# Patient Record
Sex: MALE | Race: WHITE | NOT HISPANIC OR LATINO
[De-identification: names, ages, dates, MRNs, and addresses within clinical notes are randomized per-mention and may not be internally consistent; named-entity substitution may affect disease eponyms.]

---

## 2018-08-23 ENCOUNTER — APPOINTMENT (OUTPATIENT)
Dept: UROLOGY | Facility: CLINIC | Age: 78
End: 2018-08-23
Payer: MEDICARE

## 2018-08-23 DIAGNOSIS — Z78.9 OTHER SPECIFIED HEALTH STATUS: ICD-10-CM

## 2018-08-23 DIAGNOSIS — Z80.9 FAMILY HISTORY OF MALIGNANT NEOPLASM, UNSPECIFIED: ICD-10-CM

## 2018-08-23 DIAGNOSIS — Q60.0 RENAL AGENESIS, UNILATERAL: ICD-10-CM

## 2018-08-23 PROCEDURE — 99203 OFFICE O/P NEW LOW 30 MIN: CPT | Mod: 25

## 2018-08-23 PROCEDURE — 51702 INSERT TEMP BLADDER CATH: CPT

## 2018-08-24 LAB
ANION GAP SERPL CALC-SCNC: 14 MMOL/L
APPEARANCE: CLEAR
BACTERIA: NEGATIVE
BASOPHILS # BLD AUTO: 0.04 K/UL
BASOPHILS NFR BLD AUTO: 0.7 %
BILIRUBIN URINE: NEGATIVE
BLOOD URINE: NEGATIVE
BUN SERPL-MCNC: 25 MG/DL
CALCIUM SERPL-MCNC: 9.5 MG/DL
CHLORIDE SERPL-SCNC: 102 MMOL/L
CO2 SERPL-SCNC: 23 MMOL/L
COLOR: YELLOW
CREAT SERPL-MCNC: 1.1 MG/DL
EOSINOPHIL # BLD AUTO: 0.04 K/UL
EOSINOPHIL NFR BLD AUTO: 0.7 %
GLUCOSE QUALITATIVE U: NEGATIVE MG/DL
GLUCOSE SERPL-MCNC: 108 MG/DL
HCT VFR BLD CALC: 33.2 %
HGB BLD-MCNC: 10.9 G/DL
HYALINE CASTS: 1 /LPF
IMM GRANULOCYTES NFR BLD AUTO: 0.2 %
KETONES URINE: NEGATIVE
LEUKOCYTE ESTERASE URINE: NEGATIVE
LYMPHOCYTES # BLD AUTO: 0.68 K/UL
LYMPHOCYTES NFR BLD AUTO: 12.1 %
MAN DIFF?: NORMAL
MCHC RBC-ENTMCNC: 31.9 PG
MCHC RBC-ENTMCNC: 32.8 GM/DL
MCV RBC AUTO: 97.1 FL
MICROSCOPIC-UA: NORMAL
MONOCYTES # BLD AUTO: 0.47 K/UL
MONOCYTES NFR BLD AUTO: 8.4 %
NEUTROPHILS # BLD AUTO: 4.38 K/UL
NEUTROPHILS NFR BLD AUTO: 77.9 %
NITRITE URINE: NEGATIVE
PH URINE: 6
PLATELET # BLD AUTO: 165 K/UL
POTASSIUM SERPL-SCNC: 4.9 MMOL/L
PROTEIN URINE: 100 MG/DL
RBC # BLD: 3.42 M/UL
RBC # FLD: 17.2 %
RED BLOOD CELLS URINE: 6 /HPF
SODIUM SERPL-SCNC: 139 MMOL/L
SPECIFIC GRAVITY URINE: 1.01
SQUAMOUS EPITHELIAL CELLS: 2 /HPF
UROBILINOGEN URINE: NEGATIVE MG/DL
WBC # FLD AUTO: 5.62 K/UL
WHITE BLOOD CELLS URINE: 2 /HPF

## 2018-08-27 LAB — BACTERIA UR CULT: NORMAL

## 2018-08-30 ENCOUNTER — OUTPATIENT (OUTPATIENT)
Dept: OUTPATIENT SERVICES | Facility: HOSPITAL | Age: 78
LOS: 1 days | End: 2018-08-30
Payer: MEDICARE

## 2018-08-30 ENCOUNTER — APPOINTMENT (OUTPATIENT)
Dept: UROLOGY | Facility: CLINIC | Age: 78
End: 2018-08-30
Payer: MEDICARE

## 2018-08-30 VITALS
BODY MASS INDEX: 25.9 KG/M2 | TEMPERATURE: 97.4 F | SYSTOLIC BLOOD PRESSURE: 107 MMHG | HEIGHT: 67 IN | DIASTOLIC BLOOD PRESSURE: 61 MMHG | RESPIRATION RATE: 15 BRPM | HEART RATE: 71 BPM | WEIGHT: 165 LBS

## 2018-08-30 PROCEDURE — 99214 OFFICE O/P EST MOD 30 MIN: CPT | Mod: 25

## 2018-08-30 PROCEDURE — 51702 INSERT TEMP BLADDER CATH: CPT

## 2018-08-30 PROCEDURE — 51798 US URINE CAPACITY MEASURE: CPT

## 2018-09-04 DIAGNOSIS — N40.1 BENIGN PROSTATIC HYPERPLASIA WITH LOWER URINARY TRACT SYMPTOMS: ICD-10-CM

## 2018-09-04 DIAGNOSIS — R33.8 OTHER RETENTION OF URINE: ICD-10-CM

## 2018-09-07 ENCOUNTER — OUTPATIENT (OUTPATIENT)
Dept: OUTPATIENT SERVICES | Facility: HOSPITAL | Age: 78
LOS: 1 days | End: 2018-09-07
Payer: MEDICARE

## 2018-09-07 VITALS
DIASTOLIC BLOOD PRESSURE: 74 MMHG | SYSTOLIC BLOOD PRESSURE: 113 MMHG | OXYGEN SATURATION: 99 % | TEMPERATURE: 98 F | HEART RATE: 67 BPM | HEIGHT: 67 IN | WEIGHT: 166.01 LBS | RESPIRATION RATE: 15 BRPM

## 2018-09-07 DIAGNOSIS — Z01.818 ENCOUNTER FOR OTHER PREPROCEDURAL EXAMINATION: ICD-10-CM

## 2018-09-07 DIAGNOSIS — I48.91 UNSPECIFIED ATRIAL FIBRILLATION: Chronic | ICD-10-CM

## 2018-09-07 DIAGNOSIS — N40.0 BENIGN PROSTATIC HYPERPLASIA WITHOUT LOWER URINARY TRACT SYMPTOMS: ICD-10-CM

## 2018-09-07 DIAGNOSIS — I48.91 UNSPECIFIED ATRIAL FIBRILLATION: ICD-10-CM

## 2018-09-07 DIAGNOSIS — N40.1 BENIGN PROSTATIC HYPERPLASIA WITH LOWER URINARY TRACT SYMPTOMS: ICD-10-CM

## 2018-09-07 DIAGNOSIS — Z98.890 OTHER SPECIFIED POSTPROCEDURAL STATES: Chronic | ICD-10-CM

## 2018-09-07 DIAGNOSIS — I25.10 ATHEROSCLEROTIC HEART DISEASE OF NATIVE CORONARY ARTERY WITHOUT ANGINA PECTORIS: ICD-10-CM

## 2018-09-07 LAB
BLD GP AB SCN SERPL QL: NEGATIVE — SIGNIFICANT CHANGE UP
RH IG SCN BLD-IMP: POSITIVE — SIGNIFICANT CHANGE UP

## 2018-09-07 PROCEDURE — 86900 BLOOD TYPING SEROLOGIC ABO: CPT

## 2018-09-07 PROCEDURE — G0463: CPT

## 2018-09-07 PROCEDURE — 86850 RBC ANTIBODY SCREEN: CPT

## 2018-09-07 PROCEDURE — 86901 BLOOD TYPING SEROLOGIC RH(D): CPT

## 2018-09-07 RX ORDER — SODIUM CHLORIDE 9 MG/ML
3 INJECTION INTRAMUSCULAR; INTRAVENOUS; SUBCUTANEOUS EVERY 8 HOURS
Qty: 0 | Refills: 0 | Status: DISCONTINUED | OUTPATIENT
Start: 2018-09-12 | End: 2018-09-16

## 2018-09-07 NOTE — H&P PST ADULT - PMH
Adult Onset Diabetes Mellitus,  2  on diet management  Benign Essential Hypertension    History of Cervical Biopsy    HLD (hyperlipidemia)    Large cell lymphoma  2009, had Chemo & RT  Leg swelling  bilateral legs Adult Onset Diabetes Mellitus,  2  on diet management  Benign Essential Hypertension    CAD (coronary artery disease)  2001,cabg   & PCI 2002  History of Cervical Biopsy    HLD (hyperlipidemia)    Large cell lymphoma  2009, had Chemo & RT  Leg swelling  bilateral legs

## 2018-09-07 NOTE — H&P PST ADULT - PROBLEM SELECTOR PLAN 3
As per PMD's off warfarin from 09/07/18 & bridge with Xarelto until 09/10/18 , but as per patient he had severe nose bleed with xarelto in the past  & Called surgeon(Ms Coffey) & cardiologist (Ms gil)  confirm about warfarin plan .  cardiologist called back & advised off coumadin from today(last dose 09/06/18)

## 2018-09-07 NOTE — H&P PST ADULT - PSH
Afib    H/O coronary angiogram    History of Biopsy    S/P CABG  2001    [facility]  S/P Laparoscopic Cholecystectomy

## 2018-09-07 NOTE — H&P PST ADULT - ATTENDING COMMENTS
patient stopped coumadin 6 days prior to surgery. I discussed plan of remaining off anticoagulation for approximately 7 days post-op in the context of atrial fibrillation. Patient communicates understanding of small risk of stroke vs bleeding risk of immediately resuming anticoagulation and is in full agreement with this plan.

## 2018-09-07 NOTE — H&P PST ADULT - HISTORY OF PRESENT ILLNESS
78 year old male with retention of urine 2 weeks ago, s/p urology consult & placed Xiong to leg bag on 10/30/18, now presents in PST, scheduled for cystoscopy & TURP on 09/12/18. 78 year old male PMH of HTN, HLD, Large cell lymphoma(2009, chemo/RT-in remission), LE swelling, CAD(CABG 2001,coronary artery stent X1 in 2002),Afib(on warfarin),Epistaxis" from blood thinners like Xarelto", now presents in PST  with retention of urine 2 weeks ago, s/p urology consult & placed palma to leg bag on 10/30/18, scheduled for cystoscopy & TURP on 09/12/18.

## 2018-09-07 NOTE — H&P PST ADULT - NSANTHOSAYNRD_GEN_A_CORE
No. CORONA screening performed.  STOP BANG Legend: 0-2 = LOW Risk; 3-4 = INTERMEDIATE Risk; 5-8 = HIGH Risk

## 2018-09-07 NOTE — H&P PST ADULT - MUSCULOSKELETAL
negative detailed exam ROM intact/no joint erythema/no joint warmth/no joint swelling/no calf tenderness/normal strength

## 2018-09-08 PROBLEM — M79.89 OTHER SPECIFIED SOFT TISSUE DISORDERS: Chronic | Status: ACTIVE | Noted: 2018-09-07

## 2018-09-11 ENCOUNTER — TRANSCRIPTION ENCOUNTER (OUTPATIENT)
Age: 78
End: 2018-09-11

## 2018-09-12 ENCOUNTER — INPATIENT (INPATIENT)
Facility: HOSPITAL | Age: 78
LOS: 3 days | Discharge: HOME | DRG: 713 | End: 2018-09-16
Attending: UROLOGY | Admitting: UROLOGY
Payer: MEDICARE

## 2018-09-12 ENCOUNTER — RESULT REVIEW (OUTPATIENT)
Age: 78
End: 2018-09-12

## 2018-09-12 ENCOUNTER — APPOINTMENT (OUTPATIENT)
Dept: UROLOGY | Facility: HOSPITAL | Age: 78
End: 2018-09-12

## 2018-09-12 VITALS
TEMPERATURE: 98 F | RESPIRATION RATE: 18 BRPM | OXYGEN SATURATION: 97 % | WEIGHT: 166.01 LBS | HEIGHT: 67 IN | SYSTOLIC BLOOD PRESSURE: 109 MMHG | DIASTOLIC BLOOD PRESSURE: 71 MMHG | HEART RATE: 64 BPM

## 2018-09-12 DIAGNOSIS — I48.91 UNSPECIFIED ATRIAL FIBRILLATION: Chronic | ICD-10-CM

## 2018-09-12 DIAGNOSIS — N40.1 BENIGN PROSTATIC HYPERPLASIA WITH LOWER URINARY TRACT SYMPTOMS: ICD-10-CM

## 2018-09-12 DIAGNOSIS — Z98.890 OTHER SPECIFIED POSTPROCEDURAL STATES: Chronic | ICD-10-CM

## 2018-09-12 LAB
ANION GAP SERPL CALC-SCNC: 14 MMOL/L — SIGNIFICANT CHANGE UP (ref 5–17)
BASOPHILS # BLD AUTO: 0 K/UL — SIGNIFICANT CHANGE UP (ref 0–0.2)
BASOPHILS NFR BLD AUTO: 0.3 % — SIGNIFICANT CHANGE UP (ref 0–2)
BUN SERPL-MCNC: 19 MG/DL — SIGNIFICANT CHANGE UP (ref 7–23)
CALCIUM SERPL-MCNC: 9.3 MG/DL — SIGNIFICANT CHANGE UP (ref 8.4–10.5)
CHLORIDE SERPL-SCNC: 95 MMOL/L — LOW (ref 96–108)
CO2 SERPL-SCNC: 29 MMOL/L — SIGNIFICANT CHANGE UP (ref 22–31)
CREAT SERPL-MCNC: 1.11 MG/DL — SIGNIFICANT CHANGE UP (ref 0.5–1.3)
EOSINOPHIL # BLD AUTO: 0.1 K/UL — SIGNIFICANT CHANGE UP (ref 0–0.5)
EOSINOPHIL NFR BLD AUTO: 1.2 % — SIGNIFICANT CHANGE UP (ref 0–6)
GLUCOSE BLDC GLUCOMTR-MCNC: 122 MG/DL — HIGH (ref 70–99)
GLUCOSE SERPL-MCNC: 131 MG/DL — HIGH (ref 70–99)
HCT VFR BLD CALC: 35.8 % — LOW (ref 39–50)
HGB BLD-MCNC: 11.5 G/DL — LOW (ref 13–17)
LYMPHOCYTES # BLD AUTO: 0.6 K/UL — LOW (ref 1–3.3)
LYMPHOCYTES # BLD AUTO: 7.2 % — LOW (ref 13–44)
MCHC RBC-ENTMCNC: 30.8 PG — SIGNIFICANT CHANGE UP (ref 27–34)
MCHC RBC-ENTMCNC: 32.1 GM/DL — SIGNIFICANT CHANGE UP (ref 32–36)
MCV RBC AUTO: 95.8 FL — SIGNIFICANT CHANGE UP (ref 80–100)
MONOCYTES # BLD AUTO: 0.4 K/UL — SIGNIFICANT CHANGE UP (ref 0–0.9)
MONOCYTES NFR BLD AUTO: 5.7 % — SIGNIFICANT CHANGE UP (ref 2–14)
NEUTROPHILS # BLD AUTO: 6.6 K/UL — SIGNIFICANT CHANGE UP (ref 1.8–7.4)
NEUTROPHILS NFR BLD AUTO: 85.7 % — HIGH (ref 43–77)
PLATELET # BLD AUTO: 136 K/UL — LOW (ref 150–400)
POTASSIUM SERPL-MCNC: 3.8 MMOL/L — SIGNIFICANT CHANGE UP (ref 3.5–5.3)
POTASSIUM SERPL-SCNC: 3.8 MMOL/L — SIGNIFICANT CHANGE UP (ref 3.5–5.3)
RBC # BLD: 3.73 M/UL — LOW (ref 4.2–5.8)
RBC # FLD: 14.6 % — HIGH (ref 10.3–14.5)
RH IG SCN BLD-IMP: POSITIVE — SIGNIFICANT CHANGE UP
SODIUM SERPL-SCNC: 138 MMOL/L — SIGNIFICANT CHANGE UP (ref 135–145)
WBC # BLD: 7.7 K/UL — SIGNIFICANT CHANGE UP (ref 3.8–10.5)
WBC # FLD AUTO: 7.7 K/UL — SIGNIFICANT CHANGE UP (ref 3.8–10.5)

## 2018-09-12 PROCEDURE — 88305 TISSUE EXAM BY PATHOLOGIST: CPT | Mod: 26

## 2018-09-12 PROCEDURE — 52630 REMOVE PROSTATE REGROWTH: CPT

## 2018-09-12 RX ORDER — EPLERENONE 50 MG/1
1 TABLET, FILM COATED ORAL
Qty: 0 | Refills: 0 | COMMUNITY

## 2018-09-12 RX ORDER — EPLERENONE 50 MG/1
25 TABLET, FILM COATED ORAL DAILY
Qty: 0 | Refills: 0 | Status: DISCONTINUED | OUTPATIENT
Start: 2018-09-12 | End: 2018-09-16

## 2018-09-12 RX ORDER — METOPROLOL TARTRATE 50 MG
1 TABLET ORAL
Qty: 0 | Refills: 0 | COMMUNITY

## 2018-09-12 RX ORDER — FENTANYL CITRATE 50 UG/ML
25 INJECTION INTRAVENOUS
Qty: 0 | Refills: 0 | Status: DISCONTINUED | OUTPATIENT
Start: 2018-09-12 | End: 2018-09-12

## 2018-09-12 RX ORDER — INFLUENZA VIRUS VACCINE 15; 15; 15; 15 UG/.5ML; UG/.5ML; UG/.5ML; UG/.5ML
0.5 SUSPENSION INTRAMUSCULAR ONCE
Qty: 0 | Refills: 0 | Status: DISCONTINUED | OUTPATIENT
Start: 2018-09-12 | End: 2018-09-16

## 2018-09-12 RX ORDER — GENTAMICIN SULFATE 40 MG/ML
80 VIAL (ML) INJECTION EVERY 8 HOURS
Qty: 0 | Refills: 0 | Status: COMPLETED | OUTPATIENT
Start: 2018-09-12 | End: 2018-09-13

## 2018-09-12 RX ORDER — ONDANSETRON 8 MG/1
4 TABLET, FILM COATED ORAL EVERY 6 HOURS
Qty: 0 | Refills: 0 | Status: DISCONTINUED | OUTPATIENT
Start: 2018-09-12 | End: 2018-09-16

## 2018-09-12 RX ORDER — TAMSULOSIN HYDROCHLORIDE 0.4 MG/1
1 CAPSULE ORAL
Qty: 0 | Refills: 0 | COMMUNITY

## 2018-09-12 RX ORDER — ATORVASTATIN CALCIUM 80 MG/1
40 TABLET, FILM COATED ORAL AT BEDTIME
Qty: 0 | Refills: 0 | Status: DISCONTINUED | OUTPATIENT
Start: 2018-09-12 | End: 2018-09-16

## 2018-09-12 RX ORDER — AMPICILLIN TRIHYDRATE 250 MG
1 CAPSULE ORAL EVERY 6 HOURS
Qty: 0 | Refills: 0 | Status: DISCONTINUED | OUTPATIENT
Start: 2018-09-12 | End: 2018-09-14

## 2018-09-12 RX ORDER — TAMSULOSIN HYDROCHLORIDE 0.4 MG/1
0.4 CAPSULE ORAL AT BEDTIME
Qty: 0 | Refills: 0 | Status: DISCONTINUED | OUTPATIENT
Start: 2018-09-12 | End: 2018-09-16

## 2018-09-12 RX ORDER — DOCUSATE SODIUM 100 MG
100 CAPSULE ORAL THREE TIMES A DAY
Qty: 0 | Refills: 0 | Status: DISCONTINUED | OUTPATIENT
Start: 2018-09-12 | End: 2018-09-16

## 2018-09-12 RX ORDER — METOPROLOL TARTRATE 50 MG
25 TABLET ORAL AT BEDTIME
Qty: 0 | Refills: 0 | Status: DISCONTINUED | OUTPATIENT
Start: 2018-09-12 | End: 2018-09-16

## 2018-09-12 RX ORDER — CEFAZOLIN SODIUM 1 G
2000 VIAL (EA) INJECTION ONCE
Qty: 0 | Refills: 0 | Status: DISCONTINUED | OUTPATIENT
Start: 2018-09-12 | End: 2018-09-12

## 2018-09-12 RX ORDER — HEPARIN SODIUM 5000 [USP'U]/ML
5000 INJECTION INTRAVENOUS; SUBCUTANEOUS EVERY 8 HOURS
Qty: 0 | Refills: 0 | Status: DISCONTINUED | OUTPATIENT
Start: 2018-09-12 | End: 2018-09-16

## 2018-09-12 RX ORDER — SODIUM CHLORIDE 9 MG/ML
1000 INJECTION, SOLUTION INTRAVENOUS
Qty: 0 | Refills: 0 | Status: DISCONTINUED | OUTPATIENT
Start: 2018-09-12 | End: 2018-09-13

## 2018-09-12 RX ORDER — ATORVASTATIN CALCIUM 80 MG/1
1 TABLET, FILM COATED ORAL
Qty: 0 | Refills: 0 | COMMUNITY

## 2018-09-12 RX ORDER — FUROSEMIDE 40 MG
40 TABLET ORAL DAILY
Qty: 0 | Refills: 0 | Status: DISCONTINUED | OUTPATIENT
Start: 2018-09-12 | End: 2018-09-16

## 2018-09-12 RX ORDER — ONDANSETRON 8 MG/1
4 TABLET, FILM COATED ORAL EVERY 4 HOURS
Qty: 0 | Refills: 0 | Status: DISCONTINUED | OUTPATIENT
Start: 2018-09-12 | End: 2018-09-12

## 2018-09-12 RX ORDER — FUROSEMIDE 40 MG
1 TABLET ORAL
Qty: 0 | Refills: 0 | COMMUNITY

## 2018-09-12 RX ORDER — SENNA PLUS 8.6 MG/1
1 TABLET ORAL AT BEDTIME
Qty: 0 | Refills: 0 | Status: DISCONTINUED | OUTPATIENT
Start: 2018-09-12 | End: 2018-09-16

## 2018-09-12 RX ORDER — WARFARIN SODIUM 2.5 MG/1
1 TABLET ORAL
Qty: 0 | Refills: 0 | COMMUNITY

## 2018-09-12 RX ADMIN — TAMSULOSIN HYDROCHLORIDE 0.4 MILLIGRAM(S): 0.4 CAPSULE ORAL at 22:12

## 2018-09-12 RX ADMIN — Medication 108 GRAM(S): at 13:39

## 2018-09-12 RX ADMIN — SODIUM CHLORIDE 3 MILLILITER(S): 9 INJECTION INTRAMUSCULAR; INTRAVENOUS; SUBCUTANEOUS at 23:15

## 2018-09-12 RX ADMIN — HEPARIN SODIUM 5000 UNIT(S): 5000 INJECTION INTRAVENOUS; SUBCUTANEOUS at 22:12

## 2018-09-12 RX ADMIN — SODIUM CHLORIDE 125 MILLILITER(S): 9 INJECTION, SOLUTION INTRAVENOUS at 11:50

## 2018-09-12 RX ADMIN — Medication 100 MILLIGRAM(S): at 14:23

## 2018-09-12 RX ADMIN — Medication 40 MILLIGRAM(S): at 14:01

## 2018-09-12 RX ADMIN — Medication 100 MILLIGRAM(S): at 22:17

## 2018-09-12 RX ADMIN — Medication 108 GRAM(S): at 17:40

## 2018-09-12 RX ADMIN — HEPARIN SODIUM 5000 UNIT(S): 5000 INJECTION INTRAVENOUS; SUBCUTANEOUS at 14:02

## 2018-09-12 RX ADMIN — Medication 100 MILLIGRAM(S): at 14:02

## 2018-09-12 RX ADMIN — ATORVASTATIN CALCIUM 40 MILLIGRAM(S): 80 TABLET, FILM COATED ORAL at 22:11

## 2018-09-12 RX ADMIN — Medication 100 MILLIGRAM(S): at 22:12

## 2018-09-12 NOTE — BRIEF OPERATIVE NOTE - PROCEDURE
<<-----Click on this checkbox to enter Procedure Posterior colporrhaphy (rectocele repair)  09/12/2018    Active  VVASSHERLY  Sacrocolpopexy using da [employer] System  09/12/2018    Active  VVSTEPHANYVAN Transurethral resection of prostate  09/12/2018  bipolar, cystoscopy  Active  VVASUDEVAN

## 2018-09-12 NOTE — BRIEF OPERATIVE NOTE - POST-OP DX
Pelvic organ prolapse quantification stage 3 rectocele  09/12/2018    Active  Abe Duncan BPH with urinary obstruction  09/12/2018    Active  Abe Duncan

## 2018-09-12 NOTE — PROGRESS NOTE ADULT - SUBJECTIVE AND OBJECTIVE BOX
Post op Check    Pt seen and examined without complaints. Pain is controlled. Denies SOB/CP/N/V.     Vital Signs Last 24 Hrs  T(C): 36.2 (12 Sep 2018 13:00), Max: 36.9 (12 Sep 2018 07:44)  T(F): 97.2 (12 Sep 2018 13:00), Max: 98.4 (12 Sep 2018 07:44)  HR: 64 (12 Sep 2018 14:30) (59 - 81)  BP: 124/58 (12 Sep 2018 14:30) (109/71 - 139/74)  BP(mean): 83 (12 Sep 2018 14:30) (82 - 98)  RR: 14 (12 Sep 2018 14:30) (14 - 18)  SpO2: 99% (12 Sep 2018 14:30) (95% - 100%)    I&O's Summary    12 Sep 2018 07:01  -  12 Sep 2018 15:06  --------------------------------------------------------  IN: 125 mL / OUT: 0 mL / NET: 125 mL    Physical Exam  Gen: NAD, A&Ox3  Pulm: No respiratory distress, no subcostal retractions  CV: RRR, no JVD  Abd: Soft, NT, ND  : CBI draining clear pink                           11.5   7.7   )-----------( 136      ( 12 Sep 2018 11:49 )             35.8       09-12    138  |  95<L>  |  19  ----------------------------<  131<H>  3.8   |  29  |  1.11    Ca    9.3      12 Sep 2018 11:29

## 2018-09-13 LAB
ANION GAP SERPL CALC-SCNC: 12 MMOL/L — SIGNIFICANT CHANGE UP (ref 5–17)
BUN SERPL-MCNC: 22 MG/DL — SIGNIFICANT CHANGE UP (ref 7–23)
CALCIUM SERPL-MCNC: 9.2 MG/DL — SIGNIFICANT CHANGE UP (ref 8.4–10.5)
CHLORIDE SERPL-SCNC: 97 MMOL/L — SIGNIFICANT CHANGE UP (ref 96–108)
CO2 SERPL-SCNC: 28 MMOL/L — SIGNIFICANT CHANGE UP (ref 22–31)
CREAT SERPL-MCNC: 1.1 MG/DL — SIGNIFICANT CHANGE UP (ref 0.5–1.3)
GLUCOSE SERPL-MCNC: 142 MG/DL — HIGH (ref 70–99)
HCT VFR BLD CALC: 33 % — LOW (ref 39–50)
HGB BLD-MCNC: 10.7 G/DL — LOW (ref 13–17)
MCHC RBC-ENTMCNC: 31.5 PG — SIGNIFICANT CHANGE UP (ref 27–34)
MCHC RBC-ENTMCNC: 32.4 GM/DL — SIGNIFICANT CHANGE UP (ref 32–36)
MCV RBC AUTO: 97.2 FL — SIGNIFICANT CHANGE UP (ref 80–100)
PLATELET # BLD AUTO: 137 K/UL — LOW (ref 150–400)
POTASSIUM SERPL-MCNC: 3.8 MMOL/L — SIGNIFICANT CHANGE UP (ref 3.5–5.3)
POTASSIUM SERPL-SCNC: 3.8 MMOL/L — SIGNIFICANT CHANGE UP (ref 3.5–5.3)
RBC # BLD: 3.4 M/UL — LOW (ref 4.2–5.8)
RBC # FLD: 15 % — HIGH (ref 10.3–14.5)
SODIUM SERPL-SCNC: 137 MMOL/L — SIGNIFICANT CHANGE UP (ref 135–145)
WBC # BLD: 11.8 K/UL — HIGH (ref 3.8–10.5)
WBC # FLD AUTO: 11.8 K/UL — HIGH (ref 3.8–10.5)

## 2018-09-13 RX ORDER — LANOLIN ALCOHOL/MO/W.PET/CERES
3 CREAM (GRAM) TOPICAL AT BEDTIME
Qty: 0 | Refills: 0 | Status: DISCONTINUED | OUTPATIENT
Start: 2018-09-13 | End: 2018-09-16

## 2018-09-13 RX ORDER — DIPHENHYDRAMINE HCL 50 MG
25 CAPSULE ORAL AT BEDTIME
Qty: 0 | Refills: 0 | Status: DISCONTINUED | OUTPATIENT
Start: 2018-09-13 | End: 2018-09-16

## 2018-09-13 RX ORDER — SODIUM CHLORIDE 9 MG/ML
1000 INJECTION, SOLUTION INTRAVENOUS
Qty: 0 | Refills: 0 | Status: DISCONTINUED | OUTPATIENT
Start: 2018-09-13 | End: 2018-09-14

## 2018-09-13 RX ADMIN — SENNA PLUS 1 TABLET(S): 8.6 TABLET ORAL at 21:37

## 2018-09-13 RX ADMIN — HEPARIN SODIUM 5000 UNIT(S): 5000 INJECTION INTRAVENOUS; SUBCUTANEOUS at 21:36

## 2018-09-13 RX ADMIN — Medication 108 GRAM(S): at 03:13

## 2018-09-13 RX ADMIN — TAMSULOSIN HYDROCHLORIDE 0.4 MILLIGRAM(S): 0.4 CAPSULE ORAL at 21:37

## 2018-09-13 RX ADMIN — Medication 100 MILLIGRAM(S): at 21:37

## 2018-09-13 RX ADMIN — Medication 100 MILLIGRAM(S): at 05:13

## 2018-09-13 RX ADMIN — HEPARIN SODIUM 5000 UNIT(S): 5000 INJECTION INTRAVENOUS; SUBCUTANEOUS at 05:13

## 2018-09-13 RX ADMIN — SODIUM CHLORIDE 3 MILLILITER(S): 9 INJECTION INTRAMUSCULAR; INTRAVENOUS; SUBCUTANEOUS at 05:08

## 2018-09-13 RX ADMIN — SODIUM CHLORIDE 3 MILLILITER(S): 9 INJECTION INTRAMUSCULAR; INTRAVENOUS; SUBCUTANEOUS at 21:38

## 2018-09-13 RX ADMIN — Medication 108 GRAM(S): at 09:29

## 2018-09-13 RX ADMIN — Medication 108 GRAM(S): at 15:03

## 2018-09-13 RX ADMIN — HEPARIN SODIUM 5000 UNIT(S): 5000 INJECTION INTRAVENOUS; SUBCUTANEOUS at 13:30

## 2018-09-13 RX ADMIN — Medication 25 MILLIGRAM(S): at 21:38

## 2018-09-13 RX ADMIN — ATORVASTATIN CALCIUM 40 MILLIGRAM(S): 80 TABLET, FILM COATED ORAL at 21:37

## 2018-09-13 RX ADMIN — Medication 100 MILLIGRAM(S): at 13:30

## 2018-09-13 RX ADMIN — Medication 108 GRAM(S): at 21:36

## 2018-09-13 RX ADMIN — SODIUM CHLORIDE 3 MILLILITER(S): 9 INJECTION INTRAMUSCULAR; INTRAVENOUS; SUBCUTANEOUS at 13:34

## 2018-09-13 NOTE — PROGRESS NOTE ADULT - SUBJECTIVE AND OBJECTIVE BOX
Subjective  feeling well without complaints     Objective    Vital signs  T(F): , Max: 97.7 (09-12-18 @ 17:15)  HR: 49 (09-13-18 @ 05:07)  BP: 100/59 (09-13-18 @ 05:07)  SpO2: 97% (09-13-18 @ 05:07)  Wt(kg): --    Output     09-12 @ 07:01  -  09-13 @ 07:00  --------------------------------------------------------  IN: 2850 mL / OUT: 0 mL / NET: 2850 mL        Gen awake alert nad axox3  Abd soft ntnd   Back no cvat bl    bladder nonpalp   urine off cbi was punchy. CBI restarted     Labs      09-12 @ 11:49    WBC 7.7   / Hct 35.8  / SCr --       09-12 @ 11:29    WBC --    / Hct --    / SCr 1.11

## 2018-09-14 DIAGNOSIS — I50.9 HEART FAILURE, UNSPECIFIED: ICD-10-CM

## 2018-09-14 DIAGNOSIS — Z29.9 ENCOUNTER FOR PROPHYLACTIC MEASURES, UNSPECIFIED: ICD-10-CM

## 2018-09-14 DIAGNOSIS — I25.10 ATHEROSCLEROTIC HEART DISEASE OF NATIVE CORONARY ARTERY WITHOUT ANGINA PECTORIS: ICD-10-CM

## 2018-09-14 DIAGNOSIS — E11.9 TYPE 2 DIABETES MELLITUS WITHOUT COMPLICATIONS: ICD-10-CM

## 2018-09-14 DIAGNOSIS — A41.9 SEPSIS, UNSPECIFIED ORGANISM: ICD-10-CM

## 2018-09-14 DIAGNOSIS — I48.2 CHRONIC ATRIAL FIBRILLATION: ICD-10-CM

## 2018-09-14 DIAGNOSIS — J18.1 LOBAR PNEUMONIA, UNSPECIFIED ORGANISM: ICD-10-CM

## 2018-09-14 DIAGNOSIS — C85.90 NON-HODGKIN LYMPHOMA, UNSPECIFIED, UNSPECIFIED SITE: ICD-10-CM

## 2018-09-14 DIAGNOSIS — N40.1 BENIGN PROSTATIC HYPERPLASIA WITH LOWER URINARY TRACT SYMPTOMS: ICD-10-CM

## 2018-09-14 LAB
ANION GAP SERPL CALC-SCNC: 15 MMOL/L — SIGNIFICANT CHANGE UP (ref 5–17)
BUN SERPL-MCNC: 27 MG/DL — HIGH (ref 7–23)
CALCIUM SERPL-MCNC: 9.1 MG/DL — SIGNIFICANT CHANGE UP (ref 8.4–10.5)
CHLORIDE SERPL-SCNC: 97 MMOL/L — SIGNIFICANT CHANGE UP (ref 96–108)
CO2 SERPL-SCNC: 27 MMOL/L — SIGNIFICANT CHANGE UP (ref 22–31)
CREAT SERPL-MCNC: 1.01 MG/DL — SIGNIFICANT CHANGE UP (ref 0.5–1.3)
GLUCOSE SERPL-MCNC: 141 MG/DL — HIGH (ref 70–99)
HCT VFR BLD CALC: 33.2 % — LOW (ref 39–50)
HGB BLD-MCNC: 11 G/DL — LOW (ref 13–17)
LACTATE BLDV-MCNC: 1.8 MMOL/L — SIGNIFICANT CHANGE UP (ref 0.7–2)
MCHC RBC-ENTMCNC: 32 PG — SIGNIFICANT CHANGE UP (ref 27–34)
MCHC RBC-ENTMCNC: 33.2 GM/DL — SIGNIFICANT CHANGE UP (ref 32–36)
MCV RBC AUTO: 96.3 FL — SIGNIFICANT CHANGE UP (ref 80–100)
PLATELET # BLD AUTO: 155 K/UL — SIGNIFICANT CHANGE UP (ref 150–400)
POTASSIUM SERPL-MCNC: 4 MMOL/L — SIGNIFICANT CHANGE UP (ref 3.5–5.3)
POTASSIUM SERPL-SCNC: 4 MMOL/L — SIGNIFICANT CHANGE UP (ref 3.5–5.3)
RBC # BLD: 3.45 M/UL — LOW (ref 4.2–5.8)
RBC # FLD: 15.3 % — HIGH (ref 10.3–14.5)
SODIUM SERPL-SCNC: 139 MMOL/L — SIGNIFICANT CHANGE UP (ref 135–145)
WBC # BLD: 13.9 K/UL — HIGH (ref 3.8–10.5)
WBC # FLD AUTO: 13.9 K/UL — HIGH (ref 3.8–10.5)

## 2018-09-14 PROCEDURE — 99223 1ST HOSP IP/OBS HIGH 75: CPT

## 2018-09-14 PROCEDURE — 71045 X-RAY EXAM CHEST 1 VIEW: CPT | Mod: 26

## 2018-09-14 PROCEDURE — 71250 CT THORAX DX C-: CPT | Mod: 26

## 2018-09-14 PROCEDURE — 93010 ELECTROCARDIOGRAM REPORT: CPT

## 2018-09-14 RX ORDER — POLYETHYLENE GLYCOL 3350 17 G/17G
17 POWDER, FOR SOLUTION ORAL DAILY
Qty: 0 | Refills: 0 | Status: DISCONTINUED | OUTPATIENT
Start: 2018-09-14 | End: 2018-09-16

## 2018-09-14 RX ORDER — SODIUM CHLORIDE 9 MG/ML
1000 INJECTION, SOLUTION INTRAVENOUS
Qty: 0 | Refills: 0 | Status: DISCONTINUED | OUTPATIENT
Start: 2018-09-14 | End: 2018-09-15

## 2018-09-14 RX ORDER — VANCOMYCIN HCL 1 G
1250 VIAL (EA) INTRAVENOUS EVERY 12 HOURS
Qty: 0 | Refills: 0 | Status: DISCONTINUED | OUTPATIENT
Start: 2018-09-14 | End: 2018-09-16

## 2018-09-14 RX ORDER — ACETAMINOPHEN 500 MG
1000 TABLET ORAL ONCE
Qty: 0 | Refills: 0 | Status: COMPLETED | OUTPATIENT
Start: 2018-09-14 | End: 2018-09-14

## 2018-09-14 RX ORDER — PIPERACILLIN AND TAZOBACTAM 4; .5 G/20ML; G/20ML
4.5 INJECTION, POWDER, LYOPHILIZED, FOR SOLUTION INTRAVENOUS EVERY 8 HOURS
Qty: 0 | Refills: 0 | Status: DISCONTINUED | OUTPATIENT
Start: 2018-09-14 | End: 2018-09-16

## 2018-09-14 RX ADMIN — Medication 25 MILLIGRAM(S): at 21:57

## 2018-09-14 RX ADMIN — HEPARIN SODIUM 5000 UNIT(S): 5000 INJECTION INTRAVENOUS; SUBCUTANEOUS at 21:55

## 2018-09-14 RX ADMIN — PIPERACILLIN AND TAZOBACTAM 25 GRAM(S): 4; .5 INJECTION, POWDER, LYOPHILIZED, FOR SOLUTION INTRAVENOUS at 12:46

## 2018-09-14 RX ADMIN — Medication 108 GRAM(S): at 01:27

## 2018-09-14 RX ADMIN — SODIUM CHLORIDE 3 MILLILITER(S): 9 INJECTION INTRAMUSCULAR; INTRAVENOUS; SUBCUTANEOUS at 23:00

## 2018-09-14 RX ADMIN — Medication 100 MILLIGRAM(S): at 14:47

## 2018-09-14 RX ADMIN — Medication 100 MILLIGRAM(S): at 21:57

## 2018-09-14 RX ADMIN — HEPARIN SODIUM 5000 UNIT(S): 5000 INJECTION INTRAVENOUS; SUBCUTANEOUS at 14:47

## 2018-09-14 RX ADMIN — POLYETHYLENE GLYCOL 3350 17 GRAM(S): 17 POWDER, FOR SOLUTION ORAL at 18:45

## 2018-09-14 RX ADMIN — PIPERACILLIN AND TAZOBACTAM 25 GRAM(S): 4; .5 INJECTION, POWDER, LYOPHILIZED, FOR SOLUTION INTRAVENOUS at 02:59

## 2018-09-14 RX ADMIN — SODIUM CHLORIDE 75 MILLILITER(S): 9 INJECTION, SOLUTION INTRAVENOUS at 17:39

## 2018-09-14 RX ADMIN — SODIUM CHLORIDE 3 MILLILITER(S): 9 INJECTION INTRAMUSCULAR; INTRAVENOUS; SUBCUTANEOUS at 04:51

## 2018-09-14 RX ADMIN — SODIUM CHLORIDE 3 MILLILITER(S): 9 INJECTION INTRAMUSCULAR; INTRAVENOUS; SUBCUTANEOUS at 14:10

## 2018-09-14 RX ADMIN — Medication 166.67 MILLIGRAM(S): at 18:46

## 2018-09-14 RX ADMIN — TAMSULOSIN HYDROCHLORIDE 0.4 MILLIGRAM(S): 0.4 CAPSULE ORAL at 21:57

## 2018-09-14 RX ADMIN — SENNA PLUS 1 TABLET(S): 8.6 TABLET ORAL at 21:57

## 2018-09-14 RX ADMIN — HEPARIN SODIUM 5000 UNIT(S): 5000 INJECTION INTRAVENOUS; SUBCUTANEOUS at 06:12

## 2018-09-14 RX ADMIN — Medication 400 MILLIGRAM(S): at 01:53

## 2018-09-14 RX ADMIN — ATORVASTATIN CALCIUM 40 MILLIGRAM(S): 80 TABLET, FILM COATED ORAL at 21:57

## 2018-09-14 RX ADMIN — PIPERACILLIN AND TAZOBACTAM 25 GRAM(S): 4; .5 INJECTION, POWDER, LYOPHILIZED, FOR SOLUTION INTRAVENOUS at 21:55

## 2018-09-14 NOTE — CONSULT NOTE ADULT - PROBLEM SELECTOR RECOMMENDATION 5
-C/w atorvastatin.  -Agree with holding eplerenone, furosemide, metoprolol, and metolazone for now in setting of sepsis. Will resume once vitals stable/improved.

## 2018-09-14 NOTE — CONSULT NOTE ADULT - PROBLEM SELECTOR RECOMMENDATION 7
-S/p TURP by urology on 9/12/18. Now getting CBI.   -C/w tamsulosin.   -Increase bowel regimen for constipation.   -Care as per urology.

## 2018-09-14 NOTE — PROVIDER CONTACT NOTE (OTHER) - ASSESSMENT
pt endorsed to RN that he cant stop shivering, pt denies feeling cold, denies chills, denies chest pain.rectal temp taken&was 101,, rr 20, o2 sat 85-86% on roomair,pt verbalized difficulty breathing while sitting in chair so he prefers to stand.

## 2018-09-14 NOTE — CONSULT NOTE ADULT - PROBLEM SELECTOR RECOMMENDATION 4
-Holding AC for ~7 days post-TURP per urology recs.   -Coumadin has been on hold. Reportedly has had nose bleeds on Xarelto in the past.   -Recommend discussing with PMD/cardiologist regarding which AC to resume once able.   -Hold metoprolol for now in setting of sepsis. Can resume once sepsis resolves.

## 2018-09-14 NOTE — CONSULT NOTE ADULT - PROBLEM SELECTOR RECOMMENDATION 2
-LLL consolidation noted on CXR.   -Get CT chest C- to further evaluate.   -C/w abx as above.   -Incentive spirometer, Chest PT, OOB to chair.   -Send sputum culture.   -Add guaifenesin ER 600mg Q12H.

## 2018-09-14 NOTE — PROVIDER CONTACT NOTE (OTHER) - ACTION/TREATMENT ORDERED:
provider aware and at bedside. IV tylenol given, xray and ekg completed. labs drawn, will continue to monito rpt.

## 2018-09-14 NOTE — CONSULT NOTE ADULT - PROBLEM SELECTOR RECOMMENDATION 9
-Patient had fever, chills, leukocytosis, tachycardia, SOB/hypoxia with LLL consolidation on CXR; consistent with sepsis due to PNA. Lactate within normal. -Improving with IVF's and zosyn.   -Monitor vitals Q4H.   -C/w IVF's for now, but caution in setting of CHF history.   -C/w Zosyn for now. Add vancomycin 1250mg Q12H for HCAP. Monitor vanco trough prior to 4th dose.   -F/u blood cultures.   -Send urine culture and sputum culture.   -Monitor CBC daily. Repeat lactate if vitals worsen.   -If patient clinically decompensates, low threshold for ICU consult. -C/w heparin SQ for DVT PPx.   -Increase bowel regimen for constipation.   -PT eval pending. OOB to chair.

## 2018-09-14 NOTE — CONSULT NOTE ADULT - SUBJECTIVE AND OBJECTIVE BOX
Patient is a 78y old  Male who presents with a chief complaint of BPH (07 Sep 2018 16:04)    HPI:    PMH:    Past surgical history:    Family history:    Social history:    Allergies:      SUBJECTIVE / OVERNIGHT EVENTS:      MEDICATIONS  (STANDING):  atorvastatin 40 milliGRAM(s) Oral at bedtime  docusate sodium 100 milliGRAM(s) Oral three times a day  eplerenone 25 milliGRAM(s) Oral daily  furosemide    Tablet 40 milliGRAM(s) Oral daily  heparin  Injectable 5000 Unit(s) SubCutaneous every 8 hours  influenza   Vaccine 0.5 milliLiter(s) IntraMuscular once  lactated ringers. 1000 milliLiter(s) (125 mL/Hr) IV Continuous <Continuous>  metolazone 5 milliGRAM(s) Oral daily  metoprolol tartrate 25 milliGRAM(s) Oral at bedtime  piperacillin/tazobactam IVPB. 4.5 Gram(s) IV Intermittent every 8 hours  senna 1 Tablet(s) Oral at bedtime  sodium chloride 0.9% lock flush 3 milliLiter(s) IV Push every 8 hours  tamsulosin 0.4 milliGRAM(s) Oral at bedtime    MEDICATIONS  (PRN):  diphenhydrAMINE   Capsule 25 milliGRAM(s) Oral at bedtime PRN Insomnia  melatonin 3 milliGRAM(s) Oral at bedtime PRN Insomnia  ondansetron Injectable 4 milliGRAM(s) IV Push every 6 hours PRN Nausea and/or Vomiting      Vital Signs Last 24 Hrs  T(C): 36.4 (14 Sep 2018 09:21), Max: 38.3 (14 Sep 2018 01:49)  T(F): 97.6 (14 Sep 2018 09:21), Max: 101 (14 Sep 2018 01:49)  HR: 55 (14 Sep 2018 09:21) (55 - 130)  BP: 102/58 (14 Sep 2018 09:21) (95/60 - 134/69)  BP(mean): --  RR: 18 (14 Sep 2018 09:21) (18 - 18)  SpO2: 97% (14 Sep 2018 09:21) (85% - 100%)  CAPILLARY BLOOD GLUCOSE        I&O's Summary    13 Sep 2018 07:01  -  14 Sep 2018 07:00  --------------------------------------------------------  IN: 3520 mL / OUT: 0 mL / NET: 3520 mL          PHYSICAL EXAM:   GENERAL: NAD, well-developed  HEAD: Atraumatic, Normocephalic  EYES: Conjunctiva and sclera clear  NECK: Supple  CHEST/LUNG: Decreased BS in Left lung base.   HEART: Irregular rate and rhythm; No murmurs, rubs, or gallops  ABDOMEN: Soft, mildly tender, Nondistended; Bowel sounds present; Xiong with pinkish urine.   EXTREMITIES: 1+ edema in LE's.   PSYCH/Neuro: AAOx3. Non-focal.   SKIN: No rashes or lesions      LABS:                        11.0   13.9  )-----------( 155      ( 14 Sep 2018 02:27 )             33.2     09-14    139  |  97  |  27<H>  ----------------------------<  141<H>  4.0   |  27  |  1.01    Ca    9.1      14 Sep 2018 02:27        EKG reviewed with Afib.     CXR reviewed by me: LLL consolidation.     RADIOLOGY & ADDITIONAL TESTS:    Imaging Personally Reviewed: CXR reviewed by me.   Consultant(s) Notes Reviewed:  Urology  Care Discussed with Consultants/Other Providers: Patient is a 78y old  Male who presents with a chief complaint of BPH (07 Sep 2018 16:04)    HPI: 78 year old male with PMH of CAD s/p CABG s/p stent, Afib on AC, DM-2 (diet controlled), lymphoma s/p chemo/RT reportedly in remission, BPH, CHF, cholecystectomy; presented with urinary retention and now s/p TURP on 18 with CBI. Had been on ampicillin since . Patient developed a fever the early morning of  associated with tachycardia, chills, SOB/hypoxia, and leukocytosis. CXR revealed LLL consolidation consistent with PNA. After fever, patient's abx changed to Zosyn and started on IVF's. Medicine consulted for assistance with management of fever and LLL PNA.     PMH:  -CAD s/p CABG s/p stent, Afib on AC, DM-2 (diet controlled), lymphoma s/p chemo/RT reportedly in remission, BPH, CHF, cholecystectomy    Past surgical history:  -CABG  -Cholecystectomy    Family history:  -Mother  of MI.   -Brother  of MI.   -No known history of prostate problems in family.     Social history:  -. Lives with his wife.   -Ex smoker quit 40 years ago.   -Drinks 1 glass of alcohol per night.   -Retired .     Allergies:  -Shellfish  -Ciprofloxacin    REVIEW OF SYSTEMS:    CONSTITUTIONAL: Chills and fever overnight.   ENMT:  No ear pain, No vertigo or throat pain  EYES: No visual changes  or photophobia  NECK: No pain or stiffness  RESPIRATORY: SOB reported. No cough, wheezing, hemoptysis  CARDIOVASCULAR: No chest pain or palpitations  GASTROINTESTINAL: Mild abdominal discomfort due to constipation reported. No nausea, vomiting, or hematemesis; No diarrhea. No melena or hematochezia.  MUSCULOSKELETAL: No joint swelling  or warmth.  GENITOURINARY: Hematuria. Getting CBI.   NEUROLOGICAL: No numbness or weakness  PSYCHIATRIC: No depression or anhedonia.  ENDOCRINE: No sx hypoglycemic episodes.  SKIN: No itching, burning, rashes, or lesions       SUBJECTIVE / OVERNIGHT EVENTS: Patient had a fever overnight with chills and SOB. He says it lasted about 2 hours. He denies cough, CP, N/V, diarrhea, pain, HA, dizziness, or other complaints. He reports some constipation.       MEDICATIONS  (STANDING):  atorvastatin 40 milliGRAM(s) Oral at bedtime  docusate sodium 100 milliGRAM(s) Oral three times a day  eplerenone 25 milliGRAM(s) Oral daily  furosemide    Tablet 40 milliGRAM(s) Oral daily  heparin  Injectable 5000 Unit(s) SubCutaneous every 8 hours  influenza   Vaccine 0.5 milliLiter(s) IntraMuscular once  lactated ringers. 1000 milliLiter(s) (125 mL/Hr) IV Continuous <Continuous>  metolazone 5 milliGRAM(s) Oral daily  metoprolol tartrate 25 milliGRAM(s) Oral at bedtime  piperacillin/tazobactam IVPB. 4.5 Gram(s) IV Intermittent every 8 hours  senna 1 Tablet(s) Oral at bedtime  sodium chloride 0.9% lock flush 3 milliLiter(s) IV Push every 8 hours  tamsulosin 0.4 milliGRAM(s) Oral at bedtime    MEDICATIONS  (PRN):  diphenhydrAMINE   Capsule 25 milliGRAM(s) Oral at bedtime PRN Insomnia  melatonin 3 milliGRAM(s) Oral at bedtime PRN Insomnia  ondansetron Injectable 4 milliGRAM(s) IV Push every 6 hours PRN Nausea and/or Vomiting      Vital Signs Last 24 Hrs  T(C): 36.4 (14 Sep 2018 09:21), Max: 38.3 (14 Sep 2018 01:49)  T(F): 97.6 (14 Sep 2018 09:21), Max: 101 (14 Sep 2018 01:49)  HR: 55 (14 Sep 2018 09:21) (55 - 130)  BP: 102/58 (14 Sep 2018 09:21) (95/60 - 134/69)  BP(mean): --  RR: 18 (14 Sep 2018 09:21) (18 - 18)  SpO2: 97% (14 Sep 2018 09:21) (85% - 100%)  CAPILLARY BLOOD GLUCOSE        I&O's Summary    13 Sep 2018 07:01  -  14 Sep 2018 07:00  --------------------------------------------------------  IN: 3520 mL / OUT: 0 mL / NET: 3520 mL          PHYSICAL EXAM:   GENERAL: NAD, well-developed  HEAD: Atraumatic, Normocephalic  EYES: Conjunctiva and sclera clear  NECK: Supple  CHEST/LUNG: Decreased BS in Left lung base.   HEART: Irregular rate and rhythm; No murmurs, rubs, or gallops  ABDOMEN: Soft, mildly tender, Nondistended; Bowel sounds present; Xiong with pinkish urine.   EXTREMITIES: 1+ edema in LE's.   PSYCH/Neuro: AAOx3. Non-focal.   SKIN: No rashes or lesions. Left upper back stable lipoma noted.       LABS:                        11.0   13.9  )-----------( 155      ( 14 Sep 2018 02:27 )             33.2     09-14    139  |  97  |  27<H>  ----------------------------<  141<H>  4.0   |  27  |  1.01    Ca    9.1      14 Sep 2018 02:27    Lactate 1.8.       EKG reviewed with Afib.     CXR reviewed by me: LLL consolidation.     RADIOLOGY & ADDITIONAL TESTS:    Imaging Personally Reviewed: CXR reviewed by me.   Consultant(s) Notes Reviewed:  Urology  Care Discussed with Consultants/Other Providers: Patient is a 78y old  Male who presents with a chief complaint of BPH (07 Sep 2018 16:04)    HPI: 78 year old male with PMH of CAD s/p CABG s/p stent, Afib on AC, DM-2 (diet controlled), lymphoma s/p chemo/RT reportedly in remission, BPH, CHF, cholecystectomy; presented with urinary retention and now s/p TURP on 18 with CBI. Had been on ampicillin since . Patient developed a fever the early morning of  associated with tachycardia, chills, SOB/hypoxia, and leukocytosis. CXR revealed LLL consolidation consistent with PNA. After fever, patient's abx changed to Zosyn and started on IVF's. Medicine consulted for assistance with management of fever and LLL PNA.     PMH:  -CAD s/p CABG s/p stent, Afib on AC, DM-2 (diet controlled), lymphoma s/p chemo/RT reportedly in remission, BPH, CHF, cholecystectomy    Past surgical history:  -CABG  -Cholecystectomy    Family history:  -Mother  of MI.   -Brother  of MI.   -No known history of prostate problems in family.     Social history:  -. Lives with his wife.   -Ex smoker quit 40 years ago.   -Drinks 1 glass of alcohol per night.   -Retired .     Allergies:  -Shellfish  -Ciprofloxacin    REVIEW OF SYSTEMS:    CONSTITUTIONAL: Chills and fever overnight.   ENMT:  No ear pain, No vertigo or throat pain  EYES: No visual changes  or photophobia  NECK: No pain or stiffness  RESPIRATORY: SOB reported. No cough, wheezing, hemoptysis  CARDIOVASCULAR: No chest pain or palpitations  GASTROINTESTINAL: Mild abdominal discomfort due to constipation reported. No nausea, vomiting, or hematemesis; No diarrhea. No melena or hematochezia.  MUSCULOSKELETAL: No joint swelling  or warmth.  GENITOURINARY: Hematuria. Getting CBI.   NEUROLOGICAL: No numbness or weakness  PSYCHIATRIC: No depression or anhedonia.  ENDOCRINE: No sx hypoglycemic episodes.  SKIN: No itching, burning, rashes, or lesions       SUBJECTIVE / OVERNIGHT EVENTS: Patient had a fever overnight with chills and SOB. He says it lasted about 2 hours. He denies cough, CP, N/V, diarrhea, pain, HA, dizziness, or other complaints. He reports some constipation.       MEDICATIONS  (STANDING):  atorvastatin 40 milliGRAM(s) Oral at bedtime  docusate sodium 100 milliGRAM(s) Oral three times a day  eplerenone 25 milliGRAM(s) Oral daily  furosemide    Tablet 40 milliGRAM(s) Oral daily  heparin  Injectable 5000 Unit(s) SubCutaneous every 8 hours  influenza   Vaccine 0.5 milliLiter(s) IntraMuscular once  lactated ringers. 1000 milliLiter(s) (125 mL/Hr) IV Continuous <Continuous>  metolazone 5 milliGRAM(s) Oral daily  metoprolol tartrate 25 milliGRAM(s) Oral at bedtime  piperacillin/tazobactam IVPB. 4.5 Gram(s) IV Intermittent every 8 hours  senna 1 Tablet(s) Oral at bedtime  sodium chloride 0.9% lock flush 3 milliLiter(s) IV Push every 8 hours  tamsulosin 0.4 milliGRAM(s) Oral at bedtime    MEDICATIONS  (PRN):  diphenhydrAMINE   Capsule 25 milliGRAM(s) Oral at bedtime PRN Insomnia  melatonin 3 milliGRAM(s) Oral at bedtime PRN Insomnia  ondansetron Injectable 4 milliGRAM(s) IV Push every 6 hours PRN Nausea and/or Vomiting      Vital Signs Last 24 Hrs  T(C): 36.4 (14 Sep 2018 09:21), Max: 38.3 (14 Sep 2018 01:49)  T(F): 97.6 (14 Sep 2018 09:21), Max: 101 (14 Sep 2018 01:49)  HR: 55 (14 Sep 2018 09:21) (55 - 130)  BP: 102/58 (14 Sep 2018 09:21) (95/60 - 134/69)  BP(mean): --  RR: 18 (14 Sep 2018 09:21) (18 - 18)  SpO2: 97% (14 Sep 2018 09:21) (85% - 100%)  CAPILLARY BLOOD GLUCOSE        I&O's Summary    13 Sep 2018 07:01  -  14 Sep 2018 07:00  --------------------------------------------------------  IN: 3520 mL / OUT: 0 mL / NET: 3520 mL          PHYSICAL EXAM:   GENERAL: NAD, well-developed  HEAD: Atraumatic, Normocephalic  EYES: Conjunctiva and sclera clear  NECK: Supple  CHEST/LUNG: Decreased BS in Left lung base.   HEART: Irregular rate and rhythm; No murmurs, rubs, or gallops  ABDOMEN: Soft, mildly tender, Nondistended; Bowel sounds present; Xiong with pinkish urine.   EXTREMITIES: 1+ edema in LE's.   PSYCH/Neuro: AAOx3. Non-focal.   SKIN: No rashes or lesions. Left upper back stable lipoma noted.       LABS:                        11.0   13.9  )-----------( 155      ( 14 Sep 2018 02:27 )             33.2     09-14    139  |  97  |  27<H>  ----------------------------<  141<H>  4.0   |  27  |  1.01    Ca    9.1      14 Sep 2018 02:27    Lactate 1.8.       EKG reviewed with Afib.     CXR reviewed by me: LLL consolidation.     RADIOLOGY & ADDITIONAL TESTS:    Imaging Personally Reviewed: CXR reviewed by me.   Consultant(s) Notes Reviewed:  Urology  Care Discussed with Consultants/Other Providers: Urology team

## 2018-09-14 NOTE — CONSULT NOTE ADULT - PROBLEM SELECTOR RECOMMENDATION 3
-Agree with holding eplerenone, furosemide, metoprolol, and metolazone for now in setting of sepsis. Will resume once vitals stable/improved.   -Caution with IVF's. Monitor for signs of fluid overload.   -Monitor I's/O's and daily weights.  -Obtain outpatient echocardiogram report if possible.  -Change diet to DASH carbohydrate consistent diet.

## 2018-09-14 NOTE — CONSULT NOTE ADULT - PROBLEM SELECTOR RECOMMENDATION 6
-Reportedly diet controlled.   -Change diet to DASH carbohydrate consistent diet.  -Check HbA1c in am.   -May need to add insulin sliding scale depending on HbA1c results.

## 2018-09-14 NOTE — CONSULT NOTE ADULT - PROBLEM SELECTOR PROBLEM 7
Left message on machine for patient to return call.   Benign prostatic hyperplasia with urinary retention

## 2018-09-14 NOTE — PROGRESS NOTE ADULT - SUBJECTIVE AND OBJECTIVE BOX
Subjective  episode of tachycardia overnight to 130 and fever. appears to have consolidation on cxr. not using incentive spirometer.  cbi held this am color dilute cherry.  cbi replaced   Objective    Vital signs  T(F): , Max: 101 (09-14-18 @ 01:49)  HR: 55 (09-14-18 @ 05:26)  BP: 95/60 (09-14-18 @ 05:26)  SpO2: 95% (09-14-18 @ 05:26)  Wt(kg): --    Output     09-13 @ 07:01  -  09-14 @ 07:00  --------------------------------------------------------  IN: 3520 mL / OUT: 0 mL / NET: 3520 mL        Gen awake alert nad axox3  Abd soft ntnd   Back no cvat bl    bladder nonpalp. urine dilute cherry off cbi     Labs      09-14 @ 02:27    WBC 13.9  / Hct 33.2  / SCr 1.01     09-13 @ 11:04    WBC 11.8  / Hct 33.0  / SCr 1.10

## 2018-09-14 NOTE — PROGRESS NOTE ADULT - SUBJECTIVE AND OBJECTIVE BOX
RN called patient have tremor. Seen and examined patient in room. patient was standing in the room c/o difficult breath and tremor. Patient state he was sleeping and the tremor wake him up. he feel difficult breath when he sit down.     VS: 90% room air, HR : 134, T: 101 rectal  Lung: R side clear, No lung sounds at the LLL  Heart: Tachycardia, irregular rhythm  : Xiong in place w/ CBI running , light pink    EKG: show A-fib  CXR,: LLL consolidation    patient 79y/o M POD#2  c/o difficult breath. EKG and CXR done, Possable PNA, give Tylenol for fever, put on O2 NC 2L. patient's PO2 go up to 100%, HR 90.  Start Zosyn/ IVF    follow up  CBC, BMP, Lactate  UCx, Blood Cx, sputum Cx

## 2018-09-15 ENCOUNTER — TRANSCRIPTION ENCOUNTER (OUTPATIENT)
Age: 78
End: 2018-09-15

## 2018-09-15 LAB
ANION GAP SERPL CALC-SCNC: 11 MMOL/L — SIGNIFICANT CHANGE UP (ref 5–17)
BUN SERPL-MCNC: 20 MG/DL — SIGNIFICANT CHANGE UP (ref 7–23)
CALCIUM SERPL-MCNC: 9 MG/DL — SIGNIFICANT CHANGE UP (ref 8.4–10.5)
CHLORIDE SERPL-SCNC: 96 MMOL/L — SIGNIFICANT CHANGE UP (ref 96–108)
CO2 SERPL-SCNC: 27 MMOL/L — SIGNIFICANT CHANGE UP (ref 22–31)
CREAT SERPL-MCNC: 1.04 MG/DL — SIGNIFICANT CHANGE UP (ref 0.5–1.3)
GLUCOSE SERPL-MCNC: 126 MG/DL — HIGH (ref 70–99)
HBA1C BLD-MCNC: 6.8 % — HIGH (ref 4–5.6)
HCT VFR BLD CALC: 32.7 % — LOW (ref 39–50)
HGB BLD-MCNC: 10.3 G/DL — LOW (ref 13–17)
MCHC RBC-ENTMCNC: 30 PG — SIGNIFICANT CHANGE UP (ref 27–34)
MCHC RBC-ENTMCNC: 31.5 GM/DL — LOW (ref 32–36)
MCV RBC AUTO: 95.4 FL — SIGNIFICANT CHANGE UP (ref 80–100)
PLATELET # BLD AUTO: 126 K/UL — LOW (ref 150–400)
POTASSIUM SERPL-MCNC: 3.5 MMOL/L — SIGNIFICANT CHANGE UP (ref 3.5–5.3)
POTASSIUM SERPL-SCNC: 3.5 MMOL/L — SIGNIFICANT CHANGE UP (ref 3.5–5.3)
RBC # BLD: 3.43 M/UL — LOW (ref 4.2–5.8)
RBC # FLD: 14.8 % — HIGH (ref 10.3–14.5)
SODIUM SERPL-SCNC: 134 MMOL/L — LOW (ref 135–145)
WBC # BLD: 6.8 K/UL — SIGNIFICANT CHANGE UP (ref 3.8–10.5)
WBC # FLD AUTO: 6.8 K/UL — SIGNIFICANT CHANGE UP (ref 3.8–10.5)

## 2018-09-15 PROCEDURE — 99233 SBSQ HOSP IP/OBS HIGH 50: CPT

## 2018-09-15 RX ORDER — FUROSEMIDE 40 MG
20 TABLET ORAL ONCE
Qty: 0 | Refills: 0 | Status: COMPLETED | OUTPATIENT
Start: 2018-09-15 | End: 2018-09-15

## 2018-09-15 RX ADMIN — HEPARIN SODIUM 5000 UNIT(S): 5000 INJECTION INTRAVENOUS; SUBCUTANEOUS at 21:34

## 2018-09-15 RX ADMIN — Medication 3 MILLIGRAM(S): at 21:34

## 2018-09-15 RX ADMIN — Medication 20 MILLIGRAM(S): at 15:47

## 2018-09-15 RX ADMIN — Medication 166.67 MILLIGRAM(S): at 18:12

## 2018-09-15 RX ADMIN — PIPERACILLIN AND TAZOBACTAM 25 GRAM(S): 4; .5 INJECTION, POWDER, LYOPHILIZED, FOR SOLUTION INTRAVENOUS at 05:02

## 2018-09-15 RX ADMIN — SODIUM CHLORIDE 3 MILLILITER(S): 9 INJECTION INTRAMUSCULAR; INTRAVENOUS; SUBCUTANEOUS at 14:09

## 2018-09-15 RX ADMIN — Medication 40 MILLIGRAM(S): at 05:05

## 2018-09-15 RX ADMIN — Medication 25 MILLIGRAM(S): at 21:34

## 2018-09-15 RX ADMIN — TAMSULOSIN HYDROCHLORIDE 0.4 MILLIGRAM(S): 0.4 CAPSULE ORAL at 21:33

## 2018-09-15 RX ADMIN — POLYETHYLENE GLYCOL 3350 17 GRAM(S): 17 POWDER, FOR SOLUTION ORAL at 14:13

## 2018-09-15 RX ADMIN — HEPARIN SODIUM 5000 UNIT(S): 5000 INJECTION INTRAVENOUS; SUBCUTANEOUS at 14:09

## 2018-09-15 RX ADMIN — PIPERACILLIN AND TAZOBACTAM 25 GRAM(S): 4; .5 INJECTION, POWDER, LYOPHILIZED, FOR SOLUTION INTRAVENOUS at 21:33

## 2018-09-15 RX ADMIN — SODIUM CHLORIDE 3 MILLILITER(S): 9 INJECTION INTRAMUSCULAR; INTRAVENOUS; SUBCUTANEOUS at 21:20

## 2018-09-15 RX ADMIN — PIPERACILLIN AND TAZOBACTAM 25 GRAM(S): 4; .5 INJECTION, POWDER, LYOPHILIZED, FOR SOLUTION INTRAVENOUS at 14:03

## 2018-09-15 RX ADMIN — HEPARIN SODIUM 5000 UNIT(S): 5000 INJECTION INTRAVENOUS; SUBCUTANEOUS at 05:06

## 2018-09-15 RX ADMIN — SODIUM CHLORIDE 3 MILLILITER(S): 9 INJECTION INTRAMUSCULAR; INTRAVENOUS; SUBCUTANEOUS at 05:08

## 2018-09-15 RX ADMIN — Medication 100 MILLIGRAM(S): at 14:09

## 2018-09-15 RX ADMIN — Medication 100 MILLIGRAM(S): at 05:05

## 2018-09-15 RX ADMIN — Medication 166.67 MILLIGRAM(S): at 05:08

## 2018-09-15 RX ADMIN — ATORVASTATIN CALCIUM 40 MILLIGRAM(S): 80 TABLET, FILM COATED ORAL at 21:33

## 2018-09-15 RX ADMIN — EPLERENONE 25 MILLIGRAM(S): 50 TABLET, FILM COATED ORAL at 05:05

## 2018-09-15 NOTE — PROGRESS NOTE ADULT - PROBLEM SELECTOR PLAN 9
-C/w heparin SQ for DVT PPx.   -Increase bowel regimen for constipation.   -PT eval pending. OOB to chair. -C/w heparin SQ for DVT PPx.   -C/w increased bowel regimen for constipation.   -PT eval pending. OOB to chair.

## 2018-09-15 NOTE — PROGRESS NOTE ADULT - PROBLEM SELECTOR PLAN 1
-Patient had fever, chills, leukocytosis, tachycardia, SOB/hypoxia with LLL consolidation on CXR; consistent with sepsis due to PNA. Lactate within normal. -Improving with IVF's and zosyn.   -Monitor vitals Q4H.   -C/w IVF's for now, but caution in setting of CHF history.   -C/w Zosyn for now. Add vancomycin 1250mg Q12H for HCAP. Monitor vanco trough prior to 4th dose.   -F/u blood cultures.   -Send urine culture and sputum culture.   -Monitor CBC daily. Repeat lactate if vitals worsen.   -If patient clinically decompensates, low threshold for ICU consult. -Patient had fever, chills, leukocytosis, tachycardia, SOB/hypoxia with LLL consolidation/effusion on CXR; consistent with sepsis due to PNA. Lactate within normal. - Now resolved s/p IVF's and broad spectrum abx.   -Monitor vitals Q4H.   -Ok to hold IVF's since vitals stabilized.   -C/w Zosyn and vancomycin 1250mg Q12H for HCAP for now. Monitor vanco trough prior to 4th dose. -Can convert to PO Augmentin 875mg BID and Doxycycline 100mg BID upon discharge to complete total 8 day course of abx.   -F/u blood cultures; NGTD.   -Send urine culture and sputum culture - pending.   -Monitor CBC daily - Leukocytosis improving. Repeat lactate if vitals worsen.

## 2018-09-15 NOTE — CONSULT NOTE ADULT - ATTENDING COMMENTS
Micky Delgado MD  Division of [facility]  Cell: [phone]  Pager: [phone]  Office: [phone]
Pt with a known h/o heart failure with decreased EF, had a left pleural effusion noted on TTE in May 2018.  Admitted for TURP. Had a post procedure fever. Now on abx.  Afebrile, wbc normal  also received IVF and had not been receiving his lasix.  no sob.  on US, left pleural effusion, simple, no septations.    Now being diursed, back on lasix.  Follow effusion.  Reasonable at some pt to consider a dx tap, however pt currently refusing.

## 2018-09-15 NOTE — DISCHARGE NOTE ADULT - CARE PLAN
Principal Discharge DX:	Benign prostatic hyperplasia with urinary retention  Goal:	improved urinary status  Assessment and plan of treatment:	BPH s/p HOLEP

## 2018-09-15 NOTE — DISCHARGE NOTE ADULT - MEDICATION SUMMARY - MEDICATIONS TO TAKE
I will START or STAY ON the medications listed below when I get home from the hospital:    eplerenone 25 mg oral tablet  -- 1 tab(s) by mouth once a day  -- Indication: For Home medication    Flomax 0.4 mg oral capsule  -- 1 cap(s) by mouth once a day  -- Indication: For Home medication    warfarin 2.5 mg oral tablet  -- 1 tab(s) by mouth once a day  -- Indication: For Home medication    Lipitor 40 mg oral tablet  -- 1 tab(s) by mouth once a day  -- Indication: For Home medication    doxycycline hyclate 100 mg oral capsule  -- 1 cap(s) by mouth 2 times a day   -- Avoid prolonged or excessive exposure to direct and/or artificial sunlight while taking this medication.  Do not take this drug if you are pregnant.  Finish all this medication unless otherwise directed by prescriber.  Medication should be taken with plenty of water.    -- Indication: For Home medication    metoprolol tartrate 25 mg oral tablet  -- 1 tab(s) by mouth once a day (at bedtime)  -- Indication: For Home medication    metOLazone 5 mg oral tablet  -- 1 tab(s) by mouth once a day  -- Indication: For Home medication    Lasix 40 mg oral tablet  -- 1 tab(s) by mouth once a day  -- Indication: For Home medication    Augmentin 875 mg-125 mg oral tablet  -- 875 milligram(s) by mouth every 12 hours   -- Finish all this medication unless otherwise directed by prescriber.  Take with food or milk.    -- Indication: For Antibiotics

## 2018-09-15 NOTE — DISCHARGE NOTE ADULT - CONDITIONS AT DISCHARGE
Pt. verbalized understanding of discharge instructions. Pt. alert and oriented x 4, ambulatory, palma to leg bag and teaching provided, tolerating diet, vss. Pt. verbalized understanding of medications prescribed and to follow up with MD in time ordered. IV lock removed and safety maintained.

## 2018-09-15 NOTE — PROGRESS NOTE ADULT - PROBLEM SELECTOR PLAN 7
-S/p TURP by urology on 9/12/18. Now getting CBI.   -C/w tamsulosin.   -Increase bowel regimen for constipation.   -Care as per urology. -S/p TURP by urology on 9/12/18. Now s/p CBI.   -C/w tamsulosin.   -C/w increased bowel regimen for constipation.   -Care as per urology.

## 2018-09-15 NOTE — PROGRESS NOTE ADULT - PROBLEM SELECTOR PLAN 6
-Reportedly diet controlled.   -Change diet to DASH carbohydrate consistent diet.  -Check HbA1c in am.   -May need to add insulin sliding scale depending on HbA1c results. -Reportedly diet controlled.   -Diet should be DASH carbohydrate consistent diet.  -HbA1c 6.8, which is acceptable for his age.   -Add low insulin sliding scale before meals and at bedtime to monitor BG.

## 2018-09-15 NOTE — PROGRESS NOTE ADULT - SUBJECTIVE AND OBJECTIVE BOX
Patient is a 78y old  Male who presents with a chief complaint of TURP (15 Sep 2018 08:53)        SUBJECTIVE / OVERNIGHT EVENTS: Patient denies SOB, cough, CP, N/V, fevers/chills, or diarrhea. Patient reports constipation.       MEDICATIONS  (STANDING):  atorvastatin 40 milliGRAM(s) Oral at bedtime  docusate sodium 100 milliGRAM(s) Oral three times a day  eplerenone 25 milliGRAM(s) Oral daily  furosemide    Tablet 40 milliGRAM(s) Oral daily  furosemide   Injectable 20 milliGRAM(s) IV Push once  heparin  Injectable 5000 Unit(s) SubCutaneous every 8 hours  influenza   Vaccine 0.5 milliLiter(s) IntraMuscular once  metolazone 5 milliGRAM(s) Oral daily  metoprolol tartrate 25 milliGRAM(s) Oral at bedtime  piperacillin/tazobactam IVPB. 4.5 Gram(s) IV Intermittent every 8 hours  senna 1 Tablet(s) Oral at bedtime  sodium chloride 0.9% lock flush 3 milliLiter(s) IV Push every 8 hours  tamsulosin 0.4 milliGRAM(s) Oral at bedtime  vancomycin  IVPB 1250 milliGRAM(s) IV Intermittent every 12 hours    MEDICATIONS  (PRN):  diphenhydrAMINE   Capsule 25 milliGRAM(s) Oral at bedtime PRN Insomnia  melatonin 3 milliGRAM(s) Oral at bedtime PRN Insomnia  ondansetron Injectable 4 milliGRAM(s) IV Push every 6 hours PRN Nausea and/or Vomiting  polyethylene glycol 3350 17 Gram(s) Oral daily PRN Constipation      Vital Signs Last 24 Hrs  T(C): 36.9 (15 Sep 2018 13:40), Max: 37.4 (14 Sep 2018 21:03)  T(F): 98.4 (15 Sep 2018 13:40), Max: 99.4 (14 Sep 2018 21:03)  HR: 60 (15 Sep 2018 13:40) (60 - 85)  BP: 104/60 (15 Sep 2018 13:40) (101/59 - 124/74)  BP(mean): --  RR: 16 (15 Sep 2018 13:40) (16 - 18)  SpO2: 93% (15 Sep 2018 13:40) (92% - 99%)  CAPILLARY BLOOD GLUCOSE        I&O's Summary    14 Sep 2018 07:01  -  15 Sep 2018 07:00  --------------------------------------------------------  IN: 2330 mL / OUT: 1 mL / NET: 2329 mL    15 Sep 2018 07:01  -  15 Sep 2018 15:01  --------------------------------------------------------  IN: 1150 mL / OUT: 2300 mL / NET: -1150 mL          PHYSICAL EXAM:   GENERAL: NAD, well-developed  HEAD:  Atraumatic, Normocephalic  EYES: Conjunctiva and sclera clear  NECK: Supple  CHEST/LUNG: Decreased BS in LLB.   HEART: Regular rate and rhythm; No murmurs, rubs, or gallops  ABDOMEN: Soft, Nontender, Nondistended; Bowel sounds present; Xiong with blood tinged urine in place.   EXTREMITIES:  1-2+ edema in LE's.   PSYCH/Neuro: AAOx3. Non-focal.   SKIN: No rashes or lesions. Left upper back mobile lipoma.       LABS:                        10.3   6.8   )-----------( 126      ( 15 Sep 2018 07:07 )             32.7     09-15    134<L>  |  96  |  20  ----------------------------<  126<H>  3.5   |  27  |  1.04    Ca    9.0      15 Sep 2018 07:07        < from: CT Chest No Cont (09.14.18 @ 22:49) >  IMPRESSION:   Moderate to large loculated left pleural effusion.    Focal patchy opacity in the right lower lobe is of uncertain etiology.   Follow-up CT scan is recommended in 3months to ensure resolution.    < end of copied text >      RADIOLOGY & ADDITIONAL TESTS:    Imaging Personally Reviewed: CT chest report. Outpatient echo report.   Consultant(s) Notes Reviewed:  Urology, pulmonary  Care Discussed with Consultants/Other Providers: Urology resident and pulmonary fellow Patient is a 78y old  Male who presents with a chief complaint of TURP (15 Sep 2018 08:53)        SUBJECTIVE / OVERNIGHT EVENTS: Patient denies SOB, cough, CP, N/V, fevers/chills, or diarrhea. Patient reports constipation.       MEDICATIONS  (STANDING):  atorvastatin 40 milliGRAM(s) Oral at bedtime  docusate sodium 100 milliGRAM(s) Oral three times a day  eplerenone 25 milliGRAM(s) Oral daily  furosemide    Tablet 40 milliGRAM(s) Oral daily  furosemide   Injectable 20 milliGRAM(s) IV Push once  heparin  Injectable 5000 Unit(s) SubCutaneous every 8 hours  influenza   Vaccine 0.5 milliLiter(s) IntraMuscular once  metolazone 5 milliGRAM(s) Oral daily  metoprolol tartrate 25 milliGRAM(s) Oral at bedtime  piperacillin/tazobactam IVPB. 4.5 Gram(s) IV Intermittent every 8 hours  senna 1 Tablet(s) Oral at bedtime  sodium chloride 0.9% lock flush 3 milliLiter(s) IV Push every 8 hours  tamsulosin 0.4 milliGRAM(s) Oral at bedtime  vancomycin  IVPB 1250 milliGRAM(s) IV Intermittent every 12 hours    MEDICATIONS  (PRN):  diphenhydrAMINE   Capsule 25 milliGRAM(s) Oral at bedtime PRN Insomnia  melatonin 3 milliGRAM(s) Oral at bedtime PRN Insomnia  ondansetron Injectable 4 milliGRAM(s) IV Push every 6 hours PRN Nausea and/or Vomiting  polyethylene glycol 3350 17 Gram(s) Oral daily PRN Constipation      Vital Signs Last 24 Hrs  T(C): 36.9 (15 Sep 2018 13:40), Max: 37.4 (14 Sep 2018 21:03)  T(F): 98.4 (15 Sep 2018 13:40), Max: 99.4 (14 Sep 2018 21:03)  HR: 60 (15 Sep 2018 13:40) (60 - 85)  BP: 104/60 (15 Sep 2018 13:40) (101/59 - 124/74)  BP(mean): --  RR: 16 (15 Sep 2018 13:40) (16 - 18)  SpO2: 93% (15 Sep 2018 13:40) (92% - 99%)  CAPILLARY BLOOD GLUCOSE        I&O's Summary    14 Sep 2018 07:01  -  15 Sep 2018 07:00  --------------------------------------------------------  IN: 2330 mL / OUT: 1 mL / NET: 2329 mL    15 Sep 2018 07:01  -  15 Sep 2018 15:01  --------------------------------------------------------  IN: 1150 mL / OUT: 2300 mL / NET: -1150 mL          PHYSICAL EXAM:   GENERAL: NAD, well-developed  HEAD:  Atraumatic, Normocephalic  EYES: Conjunctiva and sclera clear  NECK: Supple  CHEST/LUNG: Decreased BS in LLB.   HEART: Regular rate and rhythm; No murmurs, rubs, or gallops  ABDOMEN: Soft, Nontender, Nondistended; Bowel sounds present; Xiong with blood tinged urine in place.   EXTREMITIES:  1-2+ edema in LE's.   PSYCH/Neuro: AAOx3. Non-focal.   SKIN: No rashes or lesions. Left upper back mobile lipoma.       LABS:                        10.3   6.8   )-----------( 126      ( 15 Sep 2018 07:07 )             32.7     09-15    134<L>  |  96  |  20  ----------------------------<  126<H>  3.5   |  27  |  1.04    Ca    9.0      15 Sep 2018 07:07        < from: CT Chest No Cont (09.14.18 @ 22:49) >  IMPRESSION:   Moderate to large loculated left pleural effusion.    Focal patchy opacity in the right lower lobe is of uncertain etiology.   Follow-up CT scan is recommended in 3months to ensure resolution.    < end of copied text >      RADIOLOGY & ADDITIONAL TESTS:    Imaging Personally Reviewed: CT chest report. Outpatient echo report (5/2018 in paper chart).   Consultant(s) Notes Reviewed:  Urology, pulmonary  Care Discussed with Consultants/Other Providers: Urology resident and pulmonary fellow

## 2018-09-15 NOTE — PROGRESS NOTE ADULT - NSHPATTENDINGPLANDISCUSS_GEN_ALL_CORE
Urology resident and pulmonary fellow, patient and his daughter over the phone, and bedside RN Urology resident/PA and pulmonary fellow, patient and his daughter over the phone, and bedside RN

## 2018-09-15 NOTE — PROGRESS NOTE ADULT - PROBLEM SELECTOR PLAN 5
-C/w atorvastatin.  -Agree with holding eplerenone, furosemide, metoprolol, and metolazone for now in setting of sepsis. Will resume once vitals stable/improved. -C/w atorvastatin.  -Resume home eplerenone, furosemide, metoprolol, and metolazone now that sepsis resolved.

## 2018-09-15 NOTE — PROGRESS NOTE ADULT - PROBLEM SELECTOR PLAN 3
-Agree with holding eplerenone, furosemide, metoprolol, and metolazone for now in setting of sepsis. Will resume once vitals stable/improved.   -Caution with IVF's. Monitor for signs of fluid overload.   -Monitor I's/O's and daily weights.  -Obtain outpatient echocardiogram report if possible.  -Change diet to DASH carbohydrate consistent diet. -Per outside echo report (from 5/2018 - copy in chart), patient has chronic systolic heart failure (EF of 40-45%).   -Resume home eplerenone, furosemide, metoprolol, and metolazone now that sepsis resolved.   -Give additional Lasix 20mg IV push today in setting of effusion.   -S/p IVF's for sepsis.    -Monitor I's/O's and daily weights.  -Diet should be DASH with carbohydrate consistent diet.

## 2018-09-15 NOTE — CONSULT NOTE ADULT - SUBJECTIVE AND OBJECTIVE BOX
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 COmplicated by LE edema and reportedly pleural effusion in the past, though not confirmed.     Initially presented  for elective TURP after urinary retention at home.  Reports no dyspnea at baseline, ambulatory without limitations at home. Reports no dyspnea or chest pain.     Consulted now for large left pleural effusion found after the patient had a CT scan for fever on .  After fever, patient was given zosyn (had been on ampicillin post-procedure)  Today reports no pain, no shortness of breath, feels at his baseline just with some discomfort related to palma.         ROS as above, otherwise negative      PAST MEDICAL & SURGICAL HISTORY:  CAD (coronary artery disease): ,cabg   &amp; PCI   Large cell lymphoma: , had Chemo &amp; RT  HLD (hyperlipidemia)  Leg swelling: bilateral legs  History of Cervical Biopsy  Adult Onset Diabetes Mellitus,: 2  on diet management  Benign Essential Hypertension  Afib  H/O coronary angiogram  S/P Laparoscopic Cholecystectomy  S/P CAB    [facility]  History of Biopsy      Vital Signs Last 24 Hrs  T(C): 36.9 (15 Sep 2018 13:40), Max: 37.4 (14 Sep 2018 21:03)  T(F): 98.4 (15 Sep 2018 13:40), Max: 99.4 (14 Sep 2018 21:03)  HR: 60 (15 Sep 2018 13:40) (60 - 85)  BP: 104/60 (15 Sep 2018 13:40) (101/59 - 124/74)  RR: 16 (15 Sep 2018 13:40) (16 - 18)  SpO2: 93% (15 Sep 2018 13:40) (92% - 99%)      Exam  On exam, comfortable appearing man, seated in chair in no distress  Breathing comfortably on room air  Normal right sided breath sounds with occasional crackle at the base  Significantly decreased left sided breath sounds 2/3 the way up the back, dull to percussion  Minimal LE edema  Palma in place draining light red urine, no clots  Abdomen soft, NT, ND    Ultrasound exam  Lung:  Bilateral A line pattern anteriorly  B lines at right base  Left side with large pleural effusion, compressive atelectasis,   Fluid is simple and free-flowing  No pleural based masses or septations are seen    Heart:  Atrial fibrillation  Dilated LV with severely decreased systolic function  Dilated left atrium  Probably aortic stenosis    Labs and imaging reviewed in emr  no leukocytosis

## 2018-09-15 NOTE — DISCHARGE NOTE ADULT - INSTRUCTIONS
no restrictions. continue to stay well hydrated pt home w/palma. teaching provided on emptying palma

## 2018-09-15 NOTE — PROGRESS NOTE ADULT - SUBJECTIVE AND OBJECTIVE BOX
Subjective:  Palma clamped overnight, draining clear red urine.     Objectives:  T(C): 36.8 (09-15-18 @ 05:20), Max: 37.4 (09-14-18 @ 21:03)  HR: 61 (09-15-18 @ 05:20) (61 - 85)  BP: 110/64 (09-15-18 @ 05:20) (101/59 - 116/66)  RR: 18 (09-15-18 @ 05:20) (17 - 18)  SpO2: 99% (09-15-18 @ 05:20) (92% - 99%)  Wt(kg): --    09-14 @ 07:01  -  09-15 @ 07:00  --------------------------------------------------------  IN:    lactated ringers.: 1350 mL    Oral Fluid: 980 mL  Total IN: 2330 mL    OUT:    Stool: 1 mL  Total OUT: 1 mL    Total NET: 2329 mL          Physcial Exam  GENERAL: NAD, well-developed  ABDOMEN: Soft, Nontender, Nondistended;   GENITOURINARY: palma draining clear red urine with CBI clamped.    LABS:                        10.3   6.8   )-----------( 126      ( 15 Sep 2018 07:07 )             32.7     09-15    134<L>  |  96  |  20  ----------------------------<  126<H>  3.5   |  27  |  1.04    Ca    9.0      15 Sep 2018 07:07      CAPILLARY BLOOD GLUCOSE          CAPILLARY BLOOD GLUCOSE

## 2018-09-15 NOTE — DISCHARGE NOTE ADULT - ADDITIONAL INSTRUCTIONS
f/u with Dr Go in the office f/u with Dr Go in the office on tuesday 9/18/2018 f/u with Dr Go in the office on tuesday 9/18/2018 for palma catheter removal

## 2018-09-15 NOTE — DISCHARGE NOTE ADULT - HOSPITAL COURSE
pt admitted for elective prostate outlet obstruction procedure (HOLEP) 9/12. No complications. pt stayed in house on CBI for 2 days, finally able to wean and urine remained a light pink. pt tolerating po, ambulating well, stable for discharge pt admitted for elective prostate outlet obstruction procedure (HOLEP) 9/12. No complications. pt stayed in house on CBI for 2 days, finally able to wean and urine remained a light pink. pt tolerating po, ambulating well. pt had TOV on 9/16. was able to void but had PVRs of 200+ so palma was replaced. pt also with episode of SOB prompting CXR and CT Chest and pulmonary consultations which revealed a chronic enlarging left pleural effusion. Thoracentesis was offered, but patient refused and stated he would follow up with a pulmonologist. will follow Internal Medicine consultation recommendations and continue outpatient antibiotic mgmt

## 2018-09-15 NOTE — PROGRESS NOTE ADULT - PROBLEM SELECTOR PLAN 8
-Reportedly in remission s/p Rx. -Reportedly in remission s/p chemo/RT since about ?2007 per patient.   -However, CT chest with contrast from 2012 showed anterior left mediastinal mass. Patient says the lymphoma affected his lung, but he couldn't remember the details.   -Recent CT chest here with moderate to large left-sided pleural effusion concerning for possible recurrence of lymphoma.   -Pulmonary recs appreciated. Patient should have diagnostic thoracentesis.

## 2018-09-15 NOTE — DISCHARGE NOTE ADULT - PATIENT PORTAL LINK FT
You can access the [facility] Patient Portal, offered by [facility], by registering with the following website: [email or web address]

## 2018-09-15 NOTE — PROGRESS NOTE ADULT - PROBLEM SELECTOR PLAN 2
-LLL consolidation noted on CXR.   -Get CT chest C- to further evaluate.   -C/w abx as above.   -Incentive spirometer, Chest PT, OOB to chair.   -Send sputum culture.   -Add guaifenesin ER 600mg Q12H. -LLL ?consolidation noted on CXR.   -CT chest C- shows moderate to large sized left loculated pleural effusion. Consistent with either CHF vs recurrence of lymphoma.   -Of note, outpatient echocardiogram report from 5/2018 (copy in chart) shows large left-sided pleural effusion at that time. Unclear if repeating echo necessary at this time.   -C/w abx as above.   -Incentive spirometer, Chest PT, OOB to chair.   -Send sputum culture - pending.   -Add guaifenesin ER 600mg Q12H.  -Give additional Lasix 20mg IV push in setting of effusion.   -Pulmonary recs appreciated. Diagnostic thoracentesis recommended. Patient and family considering ?inpatient versus outpatient.

## 2018-09-15 NOTE — CONSULT NOTE ADULT - ASSESSMENT
78M initially presented for elective TURP for urinary retention, now found to have large left-sided pleural effusion.   Effusion is large with paradoxical diaphragm movement.     There are several etiologies of the effusion with recurrence of malignancy and heart failure the most likely.   Not likely parapneumonic given the size of the effusion. Per patient and medicine attending, report of effusion from May, though unclear. Patient reports no dyspnea now or baseline functional limitation, so the effusion has likely accumulated over a long time.    Echotexture is simple, so not likely blood or empyema.   Cannot rule out infected space, though he is very well appearing. Had one fever to 101 on 9/13, none since, no leukocytosis, chest pain, or dyspnea.     Given size and likely chronicity, not likely to achieve full lung expansion with therapeutic drainage.   Patient is hesitant to have any procedure now, though. Feels well and would like to go home. Also that he came in for elective prostate procedure and has no lung complaints.     Assessment  - Large left sided pleural effusion, likely chronic and related to heart failure  - Heart failure with severely reduced LV function, dilated left atrium    Recommend   - Diagnostic thoracentesis.    - Echocardiogram if a recent record is not available. 78M initially presented for elective TURP for urinary retention, now found to have large left-sided pleural effusion.   Effusion is large with paradoxical diaphragm movement.     There are several etiologies of the effusion with recurrence of malignancy and heart failure the most likely.   Not likely parapneumonic given the size of the effusion. Per patient and medicine attending, report of effusion from May, though unclear. Patient reports no dyspnea now or baseline functional limitation, so the effusion has likely accumulated over a long time.    Echotexture is simple, so not likely blood or empyema.   Cannot rule out infected space without fluid sample-- He is very well appearing, though. Had one fever to 101 on 9/13, none since, no leukocytosis, chest pain, or dyspnea.   Cannot assess a LLL pneumonia given the compressive atelectasis from the effusion.     Given size and likely chronicity, not likely to achieve full lung expansion with therapeutic drainage.   Patient is hesitant to have any procedure now, though. Feels well and would like to go home. Also that he came in for elective prostate procedure and has no lung complaints.     Assessment  - Large left sided pleural effusion, likely chronic and related to heart failure  - Heart failure with severely reduced LV function, dilated left atrium    Recommend   - Diagnostic thoracentesis, though patient prefers to not have another procedure at this time.  - Echocardiogram if a recent record is not available given current EF is probably less than reported 40-45%.  HF management per medicine team and patient's cardiologist.

## 2018-09-15 NOTE — PROGRESS NOTE ADULT - PROBLEM SELECTOR PLAN 4
-Holding AC for ~7 days post-TURP per urology recs.   -Coumadin has been on hold. Reportedly has had nose bleeds on Xarelto in the past.   -Recommend discussing with PMD/cardiologist regarding which AC to resume once able.   -Hold metoprolol for now in setting of sepsis. Can resume once sepsis resolves. -Holding AC for ~7 days post-TURP per urology recs.   -Coumadin has been on hold. Reportedly has had nose bleeds on Xarelto in the past.   -Recommend discussing with PMD/cardiologist regarding which AC to resume once able.   -C/w metoprolol now that sepsis resolved.

## 2018-09-16 VITALS
SYSTOLIC BLOOD PRESSURE: 113 MMHG | HEART RATE: 71 BPM | RESPIRATION RATE: 18 BRPM | OXYGEN SATURATION: 95 % | DIASTOLIC BLOOD PRESSURE: 74 MMHG | TEMPERATURE: 98 F

## 2018-09-16 LAB
ANION GAP SERPL CALC-SCNC: 14 MMOL/L — SIGNIFICANT CHANGE UP (ref 5–17)
BUN SERPL-MCNC: 23 MG/DL — SIGNIFICANT CHANGE UP (ref 7–23)
CALCIUM SERPL-MCNC: 9.7 MG/DL — SIGNIFICANT CHANGE UP (ref 8.4–10.5)
CHLORIDE SERPL-SCNC: 92 MMOL/L — LOW (ref 96–108)
CO2 SERPL-SCNC: 30 MMOL/L — SIGNIFICANT CHANGE UP (ref 22–31)
CREAT SERPL-MCNC: 1.28 MG/DL — SIGNIFICANT CHANGE UP (ref 0.5–1.3)
GLUCOSE SERPL-MCNC: 137 MG/DL — HIGH (ref 70–99)
POTASSIUM SERPL-MCNC: 3.7 MMOL/L — SIGNIFICANT CHANGE UP (ref 3.5–5.3)
POTASSIUM SERPL-SCNC: 3.7 MMOL/L — SIGNIFICANT CHANGE UP (ref 3.5–5.3)
SODIUM SERPL-SCNC: 136 MMOL/L — SIGNIFICANT CHANGE UP (ref 135–145)
VANCOMYCIN TROUGH SERPL-MCNC: 17.6 UG/ML — SIGNIFICANT CHANGE UP (ref 10–20)

## 2018-09-16 PROCEDURE — 99232 SBSQ HOSP IP/OBS MODERATE 35: CPT

## 2018-09-16 PROCEDURE — 99223 1ST HOSP IP/OBS HIGH 75: CPT | Mod: GC

## 2018-09-16 RX ORDER — FUROSEMIDE 40 MG
20 TABLET ORAL ONCE
Qty: 0 | Refills: 0 | Status: COMPLETED | OUTPATIENT
Start: 2018-09-16 | End: 2018-09-16

## 2018-09-16 RX ADMIN — SODIUM CHLORIDE 3 MILLILITER(S): 9 INJECTION INTRAMUSCULAR; INTRAVENOUS; SUBCUTANEOUS at 16:08

## 2018-09-16 RX ADMIN — Medication 166.67 MILLIGRAM(S): at 09:27

## 2018-09-16 RX ADMIN — SODIUM CHLORIDE 3 MILLILITER(S): 9 INJECTION INTRAMUSCULAR; INTRAVENOUS; SUBCUTANEOUS at 05:12

## 2018-09-16 RX ADMIN — Medication 100 MILLIGRAM(S): at 17:28

## 2018-09-16 RX ADMIN — Medication 20 MILLIGRAM(S): at 15:15

## 2018-09-16 RX ADMIN — Medication 1 TABLET(S): at 17:28

## 2018-09-16 RX ADMIN — PIPERACILLIN AND TAZOBACTAM 25 GRAM(S): 4; .5 INJECTION, POWDER, LYOPHILIZED, FOR SOLUTION INTRAVENOUS at 05:13

## 2018-09-16 RX ADMIN — HEPARIN SODIUM 5000 UNIT(S): 5000 INJECTION INTRAVENOUS; SUBCUTANEOUS at 05:13

## 2018-09-16 NOTE — PROGRESS NOTE ADULT - PROBLEM SELECTOR PLAN 2
-LLL ?consolidation noted on CXR.   -CT chest C- showed moderate to large sized left loculated pleural effusion. Consistent with either CHF vs recurrence of lymphoma.   -Of note, outpatient echocardiogram report from 5/2018 (copy in chart) shows large left-sided pleural effusion at that time. Unclear if repeating echo necessary at this time.   -C/w abx as above.   -Incentive spirometer, Chest PT, OOB to chair.   -Send sputum culture - pending.   -Add guaifenesin ER 600mg Q12H.  -Give additional Lasix 20mg IV push in setting of effusion and worsened LE edema again today.   -Pulmonary recs appreciated. Diagnostic thoracentesis recommended. Patient and family prefer outpatient. No objections to close outpatient pulmonary follow up for diagnostic thoracentesis. -LLL ?consolidation noted on CXR.   -CT chest C- showed moderate to large sized left loculated pleural effusion. Consistent with either CHF vs recurrence of lymphoma.   -Of note, outpatient echocardiogram report from 5/2018 (copy in chart) shows large left-sided pleural effusion at that time. Unclear if repeating echo necessary at this time.   -C/w abx as above.   -Incentive spirometer, Chest PT, OOB to chair.   -Send sputum culture - pending.   -Add guaifenesin ER 600mg Q12H.  -Give additional Lasix 20mg IV push in setting of effusion and worsened LE edema again today.   -Pulmonary recs appreciated. Diagnostic thoracentesis recommended. Patient and family prefer outpatient. No objections to close outpatient pulmonary follow up for diagnostic thoracentesis.  -Should also have repeat CT scan chest as outpatient in about 3 months.

## 2018-09-16 NOTE — PROGRESS NOTE ADULT - PROBLEM SELECTOR PROBLEM 8
Lymphoma, unspecified body region, unspecified lymphoma type
Lymphoma, unspecified body region, unspecified lymphoma type

## 2018-09-16 NOTE — PROGRESS NOTE ADULT - PROBLEM SELECTOR PLAN 1
-Patient had fever, chills, leukocytosis, tachycardia, SOB/hypoxia with LLL consolidation/effusion on CXR; consistent with sepsis due to PNA. Lactate within normal. - Now resolved s/p IVF's and broad spectrum abx.   -Monitor vitals Q4H.   -Now off IVF's since vitals stabilized.   -C/w Zosyn and vancomycin 1250mg Q12H for HCAP for now. Vanco trough was 17.6 and acceptable. -Can convert to PO Augmentin 875mg BID and Doxycycline 100mg BID for discharge to complete total 8 day course of abx, today day 3 of abx.   -F/u blood cultures; NGTD.   -Send urine culture and sputum culture - pending.   -Monitor CBC daily - Leukocytosis was improving. Repeat lactate if vitals worsen.

## 2018-09-16 NOTE — PROGRESS NOTE ADULT - SUBJECTIVE AND OBJECTIVE BOX
UROLOGY PROGRESS NOTE:     Subjective: Patient seen and examined at bedside. No events overnight. Pt anxious to be discharged      Objective:  Vital signs  T(F): , Max: 98.4 (09-15-18 @ 13:40)  HR: 54 (09-16-18 @ 04:40)  BP: 99/58 (09-16-18 @ 04:40)  SpO2: 93% (09-16-18 @ 04:40)    Output     I&O's Detail    15 Sep 2018 07:01  -  16 Sep 2018 07:00  --------------------------------------------------------  IN:    IV PiggyBack: 250 mL    lactated ringers.: 300 mL    Oral Fluid: 2300 mL  Total IN: 2850 mL    OUT:    Indwelling Catheter - Urethral: 6950 mL  Total OUT: 6950 mL    Total NET: -4100 mL    Physical Exam:  Gen: no acute distress  Back: No CVAT b/l  Abd: soft nt nd  : palma in place, urine pink    Labs:                        10.3   6.8   )-----------( 126      ( 15 Sep 2018 07:07 )             32.7     09-16    136  |  92<L>  |  23  ----------------------------<  137<H>  3.7   |  30  |  1.28    Ca    9.7      16 Sep 2018 06:43

## 2018-09-16 NOTE — PROGRESS NOTE ADULT - SUBJECTIVE AND OBJECTIVE BOX
Consulted for fever and LLL opacity on CXR Medicine consulted for fever and LLL opacity on CXR    Patient is a 78y old  Male who presents with a chief complaint of TURP (16 Sep 2018 10:49)        SUBJECTIVE / OVERNIGHT EVENTS: Patient denies SOB or cough or CP. Says he moved his bowels today. He is having a TOV today.       MEDICATIONS  (STANDING):  amoxicillin  875 milliGRAM(s)/clavulanate 1 Tablet(s) Oral every 12 hours  atorvastatin 40 milliGRAM(s) Oral at bedtime  docusate sodium 100 milliGRAM(s) Oral three times a day  doxycycline hyclate Capsule 100 milliGRAM(s) Oral every 12 hours  eplerenone 25 milliGRAM(s) Oral daily  furosemide    Tablet 40 milliGRAM(s) Oral daily  heparin  Injectable 5000 Unit(s) SubCutaneous every 8 hours  influenza   Vaccine 0.5 milliLiter(s) IntraMuscular once  metolazone 5 milliGRAM(s) Oral daily  metoprolol tartrate 25 milliGRAM(s) Oral at bedtime  senna 1 Tablet(s) Oral at bedtime  sodium chloride 0.9% lock flush 3 milliLiter(s) IV Push every 8 hours  tamsulosin 0.4 milliGRAM(s) Oral at bedtime    MEDICATIONS  (PRN):  diphenhydrAMINE   Capsule 25 milliGRAM(s) Oral at bedtime PRN Insomnia  melatonin 3 milliGRAM(s) Oral at bedtime PRN Insomnia  ondansetron Injectable 4 milliGRAM(s) IV Push every 6 hours PRN Nausea and/or Vomiting  polyethylene glycol 3350 17 Gram(s) Oral daily PRN Constipation      Vital Signs Last 24 Hrs  T(C): 36.6 (16 Sep 2018 14:42), Max: 36.8 (15 Sep 2018 20:52)  T(F): 97.9 (16 Sep 2018 14:42), Max: 98.2 (15 Sep 2018 20:52)  HR: 60 (16 Sep 2018 14:42) (54 - 68)  BP: 100/62 (16 Sep 2018 14:42) (99/58 - 125/63)  BP(mean): --  RR: 18 (16 Sep 2018 14:42) (16 - 18)  SpO2: 94% (16 Sep 2018 14:42) (93% - 97%)  CAPILLARY BLOOD GLUCOSE        I&O's Summary    15 Sep 2018 07:01  -  16 Sep 2018 07:00  --------------------------------------------------------  IN: 2850 mL / OUT: 6950 mL / NET: -4100 mL    16 Sep 2018 07:01  -  16 Sep 2018 16:34  --------------------------------------------------------  IN: 960 mL / OUT: 900 mL / NET: 60 mL          PHYSICAL EXAM:  GENERAL: NAD, well-developed  HEAD:  Atraumatic, Normocephalic  EYES: Conjunctiva and sclera clear  NECK: Supple  CHEST/LUNG: Decreased BS in LLB.   HEART: Irregular rate and rhythm; No murmurs, rubs, or gallops  ABDOMEN: Soft, Nontender, Nondistended; Bowel sounds present  EXTREMITIES:  2+ edema in LE's.   PSYCH/Neuro: AAOx3. Non-focal.   SKIN: No rashes or lesions. Left upper back lipoma stable.       LABS:                        10.3   6.8   )-----------( 126      ( 15 Sep 2018 07:07 )             32.7     09-16    136  |  92<L>  |  23  ----------------------------<  137<H>  3.7   |  30  |  1.28    Ca    9.7      16 Sep 2018 06:43            RADIOLOGY & ADDITIONAL TESTS:    Imaging Personally Reviewed:   Consultant(s) Notes Reviewed: Pulmonary, urology  Care Discussed with Consultants/Other Providers: Urology PA

## 2018-09-16 NOTE — PROGRESS NOTE ADULT - ASSESSMENT
77 yo male s/p TURP  cont cbi  ck am labs   will wean as able
A/P: 78y Male s/p TURP  DVT prophylaxis/OOB  Incentive spirometry  Strict I&O's  Analgesia and antiemetics as needed  Diet-Regular   AM labs  CBI overnight
Follow up medicine.  Follow up final CT.  Follow up microbiology cultures.  Cont abx  Vanc trough tomorrow AM.
possible pneumonia on CXR. medicine consult for assistance with management.  Needs chest PT/increased spirometry, OOB
s/p TURP POD#4, found to have large left pleural effusion    -f/u labs including vanc trough  -f/u medicine  -f/u pulmonary  -check abx plan  -?TOV  -discharge planning
78 year old male with PMH of CAD s/p CABG s/p stent, Afib on AC, DM-2 (diet controlled), lymphoma s/p chemo/RT reportedly in remission, BPH, CHF, cholecystectomy; presented with urinary retention and now s/p TURP on 9/12/18 with CBI. Had been on ampicillin since 9/12. Patient developed a fever the early morning of 9/14 associated with tachycardia, chills, SOB/hypoxia, and leukocytosis. CXR revealed LLL consolidation consistent with PNA. After fever, patient's abx changed to Zosyn and started on IVF's. Medicine consulted for assistance with management of fever and LLL PNA. CT chest showed moderate to large loculated left pleural effusion.
78 year old male with PMH of CAD s/p CABG s/p stent, Afib on AC, DM-2 (diet controlled), lymphoma s/p chemo/RT reportedly in remission, BPH, CHF, cholecystectomy; presented with urinary retention and now s/p TURP on 9/12/18 with CBI. Had been on ampicillin since 9/12. Patient developed a fever the early morning of 9/14 associated with tachycardia, chills, SOB/hypoxia, and leukocytosis. CXR revealed LLL consolidation consistent with PNA. After fever, patient's abx changed to Zosyn and started on IVF's. Medicine consulted for assistance with management of fever and LLL PNA. CT chest showed moderate to large loculated left pleural effusion.

## 2018-09-16 NOTE — PROGRESS NOTE ADULT - PROBLEM SELECTOR PLAN 9
-C/w heparin SQ for DVT PPx.   -C/w increased bowel regimen for constipation.   -PT eval pending. OOB to chair.  -No medical objections to discharge with close outpatient follow up as above. Please call with any questions.

## 2018-09-16 NOTE — PROGRESS NOTE ADULT - PROBLEM SELECTOR PROBLEM 7
Benign prostatic hyperplasia with urinary retention
Benign prostatic hyperplasia with urinary retention

## 2018-09-16 NOTE — PROGRESS NOTE ADULT - ATTENDING COMMENTS
Micky Delgado MD  Division of [facility]  Cell: [phone]  Pager: [phone]  Office: [phone]
Micky Delgado MD  Division of [facility]  Cell: [phone]  Pager: [phone]  Office: [phone]

## 2018-09-16 NOTE — PROGRESS NOTE ADULT - PROBLEM SELECTOR PLAN 7
-S/p TURP by urology on 9/12/18. Now s/p CBI. TOV today per urology.   -C/w tamsulosin.   -C/w increased bowel regimen for constipation.   -Care as per urology.

## 2018-09-16 NOTE — PROGRESS NOTE ADULT - PROBLEM SELECTOR PLAN 6
-Reportedly diet controlled.   -Diet should be DASH carbohydrate consistent diet.  -HbA1c 6.8, which is acceptable for his age.   -Add low insulin sliding scale before meals and at bedtime to monitor BG while inpatient.

## 2018-09-16 NOTE — PROGRESS NOTE ADULT - PROBLEM SELECTOR PLAN 4
-Holding AC for ~7 days post-TURP per urology recs.   -Coumadin has been on hold. Reportedly has had nose bleeds on Xarelto in the past.   -Per discussion with patient he most recently was on coumadin as an outpatient. Resume coumadin as outpatient with close PMD/cardiology follow up, defer timing of when to resume per urology recs.   -C/w metoprolol now that sepsis resolved.

## 2018-09-16 NOTE — PROGRESS NOTE ADULT - PROBLEM SELECTOR PLAN 8
-Reportedly in remission s/p chemo/RT since about ?2007 per patient.   -However, CT chest with contrast from 2012 showed anterior left mediastinal mass. Patient says the lymphoma affected his lung, but he couldn't remember the details.   -Recent CT chest here with moderate to large left-sided pleural effusion concerning for possible recurrence of lymphoma.   -Pulmonary recs appreciated. Patient should have diagnostic thoracentesis. Patient and family prefer outpatient. No objections to close outpatient pulmonary follow up for diagnostic thoracentesis. -Reportedly in remission s/p chemo/RT since about ?2007 per patient.   -However, CT chest with contrast from 2012 showed anterior left mediastinal mass. Patient says the lymphoma affected his lung, but he couldn't remember the details.   -Recent CT chest here with moderate to large left-sided pleural effusion concerning for possible recurrence of lymphoma.   -Pulmonary recs appreciated. Patient should have diagnostic thoracentesis. Patient and family prefer outpatient. No objections to close outpatient pulmonary follow up for diagnostic thoracentesis.  -Should also have repeat CT scan chest as outpatient in about 3 months.

## 2018-09-16 NOTE — PROGRESS NOTE ADULT - PROBLEM SELECTOR PLAN 5
-C/w atorvastatin.  -C/w home eplerenone, furosemide, metoprolol, and metolazone now that sepsis resolved. -Can continue same doses upon discharge with close outpatient cardiology/PMD follow up.

## 2018-09-16 NOTE — PROGRESS NOTE ADULT - PROBLEM SELECTOR PLAN 3
-Per outside echo report (from 5/2018 - copy in chart), patient has chronic systolic heart failure (EF of 40-45%).   -C/w home eplerenone, furosemide, metoprolol, and metolazone now that sepsis resolved. -Can continue same doses upon discharge with close outpatient cardiology/PMD follow up.   -Give additional Lasix 20mg IV push in setting of effusion and worsened LE edema again today.   -S/p IVF's for sepsis.    -Monitor I's/O's and daily weights.  -Diet should be DASH with carbohydrate consistent diet.

## 2018-09-17 PROBLEM — E78.5 HYPERLIPIDEMIA, UNSPECIFIED: Chronic | Status: ACTIVE | Noted: 2018-09-07

## 2018-09-17 PROBLEM — C85.80 OTHER SPECIFIED TYPES OF NON-HODGKIN LYMPHOMA, UNSPECIFIED SITE: Chronic | Status: ACTIVE | Noted: 2018-09-07

## 2018-09-17 PROBLEM — I25.10 ATHEROSCLEROTIC HEART DISEASE OF NATIVE CORONARY ARTERY WITHOUT ANGINA PECTORIS: Chronic | Status: ACTIVE | Noted: 2018-09-07

## 2018-09-18 ENCOUNTER — APPOINTMENT (OUTPATIENT)
Dept: UROLOGY | Facility: CLINIC | Age: 78
End: 2018-09-18
Payer: MEDICARE

## 2018-09-18 PROCEDURE — 51798 US URINE CAPACITY MEASURE: CPT

## 2018-09-18 PROCEDURE — 99024 POSTOP FOLLOW-UP VISIT: CPT

## 2018-09-19 LAB
CULTURE RESULTS: SIGNIFICANT CHANGE UP
CULTURE RESULTS: SIGNIFICANT CHANGE UP
SPECIMEN SOURCE: SIGNIFICANT CHANGE UP
SPECIMEN SOURCE: SIGNIFICANT CHANGE UP
SURGICAL PATHOLOGY STUDY: SIGNIFICANT CHANGE UP

## 2018-09-21 ENCOUNTER — APPOINTMENT (OUTPATIENT)
Dept: UROLOGY | Facility: CLINIC | Age: 78
End: 2018-09-21
Payer: MEDICARE

## 2018-09-21 VITALS
SYSTOLIC BLOOD PRESSURE: 110 MMHG | BODY MASS INDEX: 24.96 KG/M2 | DIASTOLIC BLOOD PRESSURE: 55 MMHG | HEIGHT: 67 IN | WEIGHT: 159 LBS

## 2018-09-21 PROCEDURE — 99024 POSTOP FOLLOW-UP VISIT: CPT

## 2018-09-21 PROCEDURE — 51798 US URINE CAPACITY MEASURE: CPT

## 2018-10-25 PROCEDURE — 82962 GLUCOSE BLOOD TEST: CPT

## 2018-10-25 PROCEDURE — 86901 BLOOD TYPING SEROLOGIC RH(D): CPT

## 2018-10-25 PROCEDURE — 80048 BASIC METABOLIC PNL TOTAL CA: CPT

## 2018-10-25 PROCEDURE — 83605 ASSAY OF LACTIC ACID: CPT

## 2018-10-25 PROCEDURE — 71250 CT THORAX DX C-: CPT

## 2018-10-25 PROCEDURE — 87040 BLOOD CULTURE FOR BACTERIA: CPT

## 2018-10-25 PROCEDURE — 80202 ASSAY OF VANCOMYCIN: CPT

## 2018-10-25 PROCEDURE — 93005 ELECTROCARDIOGRAM TRACING: CPT

## 2018-10-25 PROCEDURE — 86900 BLOOD TYPING SEROLOGIC ABO: CPT

## 2018-10-25 PROCEDURE — 83036 HEMOGLOBIN GLYCOSYLATED A1C: CPT

## 2018-10-25 PROCEDURE — 71045 X-RAY EXAM CHEST 1 VIEW: CPT

## 2018-10-25 PROCEDURE — 36430 TRANSFUSION BLD/BLD COMPNT: CPT

## 2018-10-25 PROCEDURE — 88305 TISSUE EXAM BY PATHOLOGIST: CPT

## 2018-10-25 PROCEDURE — 85027 COMPLETE CBC AUTOMATED: CPT

## 2018-10-26 ENCOUNTER — APPOINTMENT (OUTPATIENT)
Dept: UROLOGY | Facility: CLINIC | Age: 78
End: 2018-10-26
Payer: MEDICARE

## 2018-10-26 PROCEDURE — 99024 POSTOP FOLLOW-UP VISIT: CPT

## 2018-10-26 RX ORDER — TAMSULOSIN HYDROCHLORIDE 0.4 MG/1
0.4 CAPSULE ORAL
Qty: 180 | Refills: 1 | Status: DISCONTINUED | COMMUNITY
Start: 2018-08-23 | End: 2018-10-26

## 2018-12-27 PROBLEM — Z82.49 FAMILY HISTORY OF MYOCARDIAL INFARCTION: Status: ACTIVE | Noted: 2018-12-27

## 2018-12-27 RX ORDER — WARFARIN 2.5 MG/1
2.5 TABLET ORAL
Refills: 0 | Status: ACTIVE | COMMUNITY

## 2018-12-28 ENCOUNTER — APPOINTMENT (OUTPATIENT)
Dept: INTERNAL MEDICINE | Facility: CLINIC | Age: 78
End: 2018-12-28
Payer: MEDICARE

## 2018-12-28 VITALS — HEART RATE: 82 BPM | RESPIRATION RATE: 13 BRPM | HEIGHT: 67 IN

## 2018-12-28 DIAGNOSIS — Z82.49 FAMILY HISTORY OF ISCHEMIC HEART DISEASE AND OTHER DISEASES OF THE CIRCULATORY SYSTEM: ICD-10-CM

## 2018-12-28 DIAGNOSIS — Z85.72 PERSONAL HISTORY OF NON-HODGKIN LYMPHOMAS: ICD-10-CM

## 2018-12-28 PROCEDURE — 99213 OFFICE O/P EST LOW 20 MIN: CPT

## 2018-12-28 RX ORDER — RIVAROXABAN 15 MG/1
15 TABLET, FILM COATED ORAL DAILY
Refills: 0 | Status: DISCONTINUED | COMMUNITY
End: 2018-12-28

## 2018-12-28 NOTE — PHYSICAL EXAM
[No Acute Distress] : no acute distress [Well Nourished] : well nourished [Well Developed] : well developed [Well-Appearing] : well-appearing [Normal Sclera/Conjunctiva] : normal sclera/conjunctiva [Normal Outer Ear/Nose] : the outer ears and nose were normal in appearance [Normal Oropharynx] : the oropharynx was normal [Normal TMs] : both tympanic membranes were normal [No JVD] : no jugular venous distention [Supple] : supple [No Respiratory Distress] : no respiratory distress  [Clear to Auscultation] : lungs were clear to auscultation bilaterally [No Accessory Muscle Use] : no accessory muscle use [Normal Rate] : normal rate  [Normal S1, S2] : normal S1 and S2 [No Murmur] : no murmur heard [Normal Gait] : normal gait [Coordination Grossly Intact] : coordination grossly intact [Comprehensive Foot Exam Normal] : Right and left foot were examined and both feet are normal. No ulcers in either foot. Toes are normal and with full ROM.  Normal tactile sensation with monofilament testing throughout both feet [de-identified] : irregular  rhythm

## 2018-12-28 NOTE — HISTORY OF PRESENT ILLNESS
[FreeTextEntry1] : cough runny  nose and fatigue for  5 days  the cough is severe and worse when lying  down  he says he  has taken mucinex dm  which  he  says has not done much

## 2018-12-28 NOTE — REVIEW OF SYSTEMS
[Fever] : no fever [Night Sweats] : no night sweats [Nasal Discharge] : nasal discharge [Sore Throat] : no sore throat [Hoarseness] : hoarseness [Shortness Of Breath] : shortness of breath [Cough] : cough [Negative] : Gastrointestinal

## 2019-02-18 ENCOUNTER — APPOINTMENT (OUTPATIENT)
Dept: INTERNAL MEDICINE | Facility: CLINIC | Age: 79
End: 2019-02-18
Payer: MEDICARE

## 2019-02-18 ENCOUNTER — APPOINTMENT (OUTPATIENT)
Dept: INTERNAL MEDICINE | Facility: CLINIC | Age: 79
End: 2019-02-18

## 2019-02-18 VITALS
DIASTOLIC BLOOD PRESSURE: 66 MMHG | HEART RATE: 76 BPM | BODY MASS INDEX: 26.63 KG/M2 | SYSTOLIC BLOOD PRESSURE: 106 MMHG | WEIGHT: 170 LBS

## 2019-02-18 DIAGNOSIS — R33.9 RETENTION OF URINE, UNSPECIFIED: ICD-10-CM

## 2019-02-18 DIAGNOSIS — R33.8 OTHER RETENTION OF URINE: ICD-10-CM

## 2019-02-18 DIAGNOSIS — J06.9 ACUTE UPPER RESPIRATORY INFECTION, UNSPECIFIED: ICD-10-CM

## 2019-02-18 PROCEDURE — 93000 ELECTROCARDIOGRAM COMPLETE: CPT

## 2019-02-18 PROCEDURE — 81003 URINALYSIS AUTO W/O SCOPE: CPT | Mod: QW

## 2019-02-18 PROCEDURE — 36415 COLL VENOUS BLD VENIPUNCTURE: CPT

## 2019-02-18 PROCEDURE — 99214 OFFICE O/P EST MOD 30 MIN: CPT | Mod: 25

## 2019-02-18 RX ORDER — AMOXICILLIN AND CLAVULANATE POTASSIUM 875; 125 MG/1; MG/1
875-125 TABLET, COATED ORAL
Qty: 12 | Refills: 0 | Status: DISCONTINUED | COMMUNITY
Start: 2018-09-16

## 2019-02-18 RX ORDER — WARFARIN 5 MG/1
5 TABLET ORAL
Qty: 30 | Refills: 0 | Status: DISCONTINUED | COMMUNITY
Start: 2018-08-15

## 2019-02-18 RX ORDER — FUROSEMIDE 20 MG/1
20 TABLET ORAL
Qty: 180 | Refills: 0 | Status: DISCONTINUED | COMMUNITY
Start: 2018-09-01

## 2019-02-18 RX ORDER — HYDROCODONE BITARTRATE AND HOMATROPINE METHYLBROMIDE 5; 1.5 MG/5ML; MG/5ML
5-1.5 SYRUP ORAL
Qty: 250 | Refills: 0 | Status: DISCONTINUED | COMMUNITY
Start: 2018-12-28 | End: 2019-02-18

## 2019-02-18 RX ORDER — DOXYCYCLINE HYCLATE 100 MG/1
100 CAPSULE ORAL
Qty: 12 | Refills: 0 | Status: DISCONTINUED | COMMUNITY
Start: 2018-09-16

## 2019-02-18 NOTE — HISTORY OF PRESENT ILLNESS
[FreeTextEntry8] : Continuation of care\par Several physicians\par \par TURP done  MD: Abbi   did well\par Catheter out\par Good flow\par Renally feels fine \par \par Cardio  Nikky  watches INR re Coumadin\par Evaluated 2 - 3 d ago\par

## 2019-02-19 LAB
25(OH)D3 SERPL-MCNC: 44.6 NG/ML
ALBUMIN SERPL ELPH-MCNC: 4 G/DL
ALP BLD-CCNC: 108 U/L
ALT SERPL-CCNC: 12 U/L
ANION GAP SERPL CALC-SCNC: 12 MMOL/L
AST SERPL-CCNC: 25 U/L
BASOPHILS # BLD AUTO: 0.04 K/UL
BASOPHILS NFR BLD AUTO: 0.7 %
BILIRUB SERPL-MCNC: 0.5 MG/DL
BUN SERPL-MCNC: 37 MG/DL
CALCIUM SERPL-MCNC: 9.3 MG/DL
CHLORIDE SERPL-SCNC: 98 MMOL/L
CHOLEST SERPL-MCNC: 143 MG/DL
CHOLEST/HDLC SERPL: 2.6 RATIO
CO2 SERPL-SCNC: 29 MMOL/L
CREAT SERPL-MCNC: 1.25 MG/DL
EOSINOPHIL # BLD AUTO: 0.1 K/UL
EOSINOPHIL NFR BLD AUTO: 1.6 %
GLUCOSE SERPL-MCNC: 89 MG/DL
HBA1C MFR BLD HPLC: 6.6 %
HCT VFR BLD CALC: 33 %
HDLC SERPL-MCNC: 56 MG/DL
HGB BLD-MCNC: 10.5 G/DL
IMM GRANULOCYTES NFR BLD AUTO: 0.3 %
LDLC SERPL CALC-MCNC: 66 MG/DL
LYMPHOCYTES # BLD AUTO: 0.54 K/UL
LYMPHOCYTES NFR BLD AUTO: 8.8 %
MAN DIFF?: NORMAL
MCHC RBC-ENTMCNC: 31.3 PG
MCHC RBC-ENTMCNC: 31.8 GM/DL
MCV RBC AUTO: 98.2 FL
MONOCYTES # BLD AUTO: 1.04 K/UL
MONOCYTES NFR BLD AUTO: 16.9 %
NEUTROPHILS # BLD AUTO: 4.41 K/UL
NEUTROPHILS NFR BLD AUTO: 71.7 %
PLATELET # BLD AUTO: 171 K/UL
POTASSIUM SERPL-SCNC: 4.7 MMOL/L
PROT SERPL-MCNC: 7.2 G/DL
PSA SERPL-MCNC: 1.25 NG/ML
RBC # BLD: 3.36 M/UL
RBC # FLD: 17.8 %
SODIUM SERPL-SCNC: 139 MMOL/L
TRIGL SERPL-MCNC: 104 MG/DL
TSH SERPL-ACNC: 5.44 UIU/ML
WBC # FLD AUTO: 6.15 K/UL

## 2019-04-01 ENCOUNTER — APPOINTMENT (OUTPATIENT)
Dept: INTERNAL MEDICINE | Facility: CLINIC | Age: 79
End: 2019-04-01
Payer: MEDICARE

## 2019-04-01 VITALS — DIASTOLIC BLOOD PRESSURE: 75 MMHG | SYSTOLIC BLOOD PRESSURE: 133 MMHG | HEART RATE: 73 BPM

## 2019-04-01 PROCEDURE — 99214 OFFICE O/P EST MOD 30 MIN: CPT

## 2019-04-09 ENCOUNTER — RX RENEWAL (OUTPATIENT)
Age: 79
End: 2019-04-09

## 2019-05-01 ENCOUNTER — APPOINTMENT (OUTPATIENT)
Dept: INTERNAL MEDICINE | Facility: CLINIC | Age: 79
End: 2019-05-01
Payer: MEDICARE

## 2019-05-01 ENCOUNTER — LABORATORY RESULT (OUTPATIENT)
Age: 79
End: 2019-05-01

## 2019-05-01 VITALS
SYSTOLIC BLOOD PRESSURE: 108 MMHG | HEART RATE: 56 BPM | DIASTOLIC BLOOD PRESSURE: 62 MMHG | BODY MASS INDEX: 26.47 KG/M2 | WEIGHT: 169 LBS

## 2019-05-01 LAB
ALBUMIN SERPL ELPH-MCNC: 4.1 G/DL
ALP BLD-CCNC: 142 U/L
ALT SERPL-CCNC: 24 U/L
ANION GAP SERPL CALC-SCNC: 15 MMOL/L
AST SERPL-CCNC: 36 U/L
BILIRUB SERPL-MCNC: 0.8 MG/DL
BUN SERPL-MCNC: 36 MG/DL
CALCIUM SERPL-MCNC: 9.5 MG/DL
CHLORIDE SERPL-SCNC: 96 MMOL/L
CO2 SERPL-SCNC: 28 MMOL/L
CREAT SERPL-MCNC: 1.28 MG/DL
GLUCOSE SERPL-MCNC: 99 MG/DL
HAPTOGLOB SERPL-MCNC: 126 MG/DL
IRON SATN MFR SERPL: 25 %
IRON SERPL-MCNC: 84 UG/DL
LDH SERPL-CCNC: 213 U/L
POTASSIUM SERPL-SCNC: 4.5 MMOL/L
PROT SERPL-MCNC: 7.4 G/DL
RBC # BLD: 3.47 M/UL
RETICS # AUTO: 2.1 %
RETICS AGGREG/RBC NFR: 74.3 K/UL
SODIUM SERPL-SCNC: 139 MMOL/L
TIBC SERPL-MCNC: 342 UG/DL
UIBC SERPL-MCNC: 258 UG/DL

## 2019-05-01 PROCEDURE — 99214 OFFICE O/P EST MOD 30 MIN: CPT | Mod: 25

## 2019-05-01 PROCEDURE — 36415 COLL VENOUS BLD VENIPUNCTURE: CPT

## 2019-05-01 NOTE — PHYSICAL EXAM
[No Acute Distress] : no acute distress [Well Nourished] : well nourished [Well-Appearing] : well-appearing [Well Developed] : well developed [Normal Sclera/Conjunctiva] : normal sclera/conjunctiva [Normal Outer Ear/Nose] : the outer ears and nose were normal in appearance [EOMI] : extraocular movements intact [PERRL] : pupils equal round and reactive to light [Normal Oropharynx] : the oropharynx was normal [No JVD] : no jugular venous distention [Supple] : supple [No Lymphadenopathy] : no lymphadenopathy [No Respiratory Distress] : no respiratory distress  [Thyroid Normal, No Nodules] : the thyroid was normal and there were no nodules present [Normal Rate] : normal rate  [No Accessory Muscle Use] : no accessory muscle use [Clear to Auscultation] : lungs were clear to auscultation bilaterally [Normal S1, S2] : normal S1 and S2 [Regular Rhythm] : with a regular rhythm [No Murmur] : no murmur heard [No Carotid Bruits] : no carotid bruits [No Varicosities] : no varicosities [No Abdominal Bruit] : a ~M bruit was not heard ~T in the abdomen [Pedal Pulses Present] : the pedal pulses are present [No Palpable Aorta] : no palpable aorta [No Edema] : there was no peripheral edema [No Extremity Clubbing/Cyanosis] : no extremity clubbing/cyanosis [Soft] : abdomen soft [Non-distended] : non-distended [Non Tender] : non-tender [No Masses] : no abdominal mass palpated [No HSM] : no HSM [Normal Bowel Sounds] : normal bowel sounds [Normal Posterior Cervical Nodes] : no posterior cervical lymphadenopathy [Normal Anterior Cervical Nodes] : no anterior cervical lymphadenopathy [No Spinal Tenderness] : no spinal tenderness [No CVA Tenderness] : no CVA  tenderness [No Joint Swelling] : no joint swelling [Grossly Normal Strength/Tone] : grossly normal strength/tone [Coordination Grossly Intact] : coordination grossly intact [Normal Gait] : normal gait [No Rash] : no rash [Normal Affect] : the affect was normal [No Focal Deficits] : no focal deficits [Deep Tendon Reflexes (DTR)] : deep tendon reflexes were 2+ and symmetric [Normal Insight/Judgement] : insight and judgment were intact

## 2019-05-01 NOTE — ASSESSMENT
[FreeTextEntry1] : The cause of the anemia may have been multifactorial.\par \par Blood is drawn today to assess the anemia and the response to iron therapy.

## 2019-05-01 NOTE — HISTORY OF PRESENT ILLNESS
[FreeTextEntry1] : Followup\par Has been taking 3 doses of iron orally daily for a month. [de-identified] : Note some modest increase in his stamina with less breathlessness with affect.  He has been taking his other medications regularly and has not had swelling of his feet.

## 2019-05-02 LAB
BASOPHILS # BLD AUTO: 0.04 K/UL
BASOPHILS NFR BLD AUTO: 0.9 %
EOSINOPHIL # BLD AUTO: 0.11 K/UL
EOSINOPHIL NFR BLD AUTO: 2.5 %
FERRITIN SERPL-MCNC: 128 NG/ML
FOLATE SERPL-MCNC: >20 NG/ML
HCT VFR BLD CALC: 36.2 %
HGB BLD-MCNC: 11.4 G/DL
IMM GRANULOCYTES NFR BLD AUTO: 0.2 %
LYMPHOCYTES # BLD AUTO: 0.49 K/UL
LYMPHOCYTES NFR BLD AUTO: 11.3 %
MAN DIFF?: NORMAL
MCHC RBC-ENTMCNC: 31.5 GM/DL
MCHC RBC-ENTMCNC: 32.9 PG
MCV RBC AUTO: 104.3 FL
MONOCYTES # BLD AUTO: 0.77 K/UL
MONOCYTES NFR BLD AUTO: 17.7 %
NEUTROPHILS # BLD AUTO: 2.93 K/UL
NEUTROPHILS NFR BLD AUTO: 67.4 %
PLATELET # BLD AUTO: 131 K/UL
RBC # BLD: 3.47 M/UL
RBC # FLD: 17.5 %
VIT B12 SERPL-MCNC: >2000 PG/ML
WBC # FLD AUTO: 4.35 K/UL

## 2019-05-04 LAB
HGB A MFR BLD: 97.7 %
HGB A2 MFR BLD: 2.3 %
HGB FRACT BLD-IMP: NORMAL

## 2019-05-08 ENCOUNTER — APPOINTMENT (OUTPATIENT)
Dept: INTERNAL MEDICINE | Facility: CLINIC | Age: 79
End: 2019-05-08
Payer: MEDICARE

## 2019-05-08 VITALS
DIASTOLIC BLOOD PRESSURE: 60 MMHG | WEIGHT: 173 LBS | SYSTOLIC BLOOD PRESSURE: 112 MMHG | HEART RATE: 58 BPM | BODY MASS INDEX: 27.1 KG/M2

## 2019-05-08 PROCEDURE — 99214 OFFICE O/P EST MOD 30 MIN: CPT

## 2019-05-08 NOTE — PHYSICAL EXAM
[No Acute Distress] : no acute distress [Well Nourished] : well nourished [Well Developed] : well developed [Normal Sclera/Conjunctiva] : normal sclera/conjunctiva [Well-Appearing] : well-appearing [PERRL] : pupils equal round and reactive to light [EOMI] : extraocular movements intact [Normal Outer Ear/Nose] : the outer ears and nose were normal in appearance [Supple] : supple [No JVD] : no jugular venous distention [Normal Oropharynx] : the oropharynx was normal [No Lymphadenopathy] : no lymphadenopathy [Thyroid Normal, No Nodules] : the thyroid was normal and there were no nodules present [No Respiratory Distress] : no respiratory distress  [Clear to Auscultation] : lungs were clear to auscultation bilaterally [No Accessory Muscle Use] : no accessory muscle use [Normal Rate] : normal rate  [Regular Rhythm] : with a regular rhythm [Normal S1, S2] : normal S1 and S2 [No Murmur] : no murmur heard [No Carotid Bruits] : no carotid bruits [No Abdominal Bruit] : a ~M bruit was not heard ~T in the abdomen [No Varicosities] : no varicosities [Pedal Pulses Present] : the pedal pulses are present [No Extremity Clubbing/Cyanosis] : no extremity clubbing/cyanosis [No Palpable Aorta] : no palpable aorta [Soft] : abdomen soft [Non Tender] : non-tender [Non-distended] : non-distended [No Masses] : no abdominal mass palpated [Normal Bowel Sounds] : normal bowel sounds [No HSM] : no HSM [Normal Posterior Cervical Nodes] : no posterior cervical lymphadenopathy [Normal Anterior Cervical Nodes] : no anterior cervical lymphadenopathy [No CVA Tenderness] : no CVA  tenderness [No Spinal Tenderness] : no spinal tenderness [No Joint Swelling] : no joint swelling [No Rash] : no rash [Normal Gait] : normal gait [Grossly Normal Strength/Tone] : grossly normal strength/tone [Coordination Grossly Intact] : coordination grossly intact [Deep Tendon Reflexes (DTR)] : deep tendon reflexes were 2+ and symmetric [No Focal Deficits] : no focal deficits [Normal Affect] : the affect was normal [Normal Insight/Judgement] : insight and judgment were intact [de-identified] : 2 - 3 + edema lower legs / feet

## 2019-05-08 NOTE — PLAN
[FreeTextEntry1] : Continue iron   3x for now\par \par Incr furosemide 40 2x\par Incr eplerenone 25 2x\par \par Warfarion as per Nikky\par \par R V  3 w

## 2019-05-08 NOTE — HISTORY OF PRESENT ILLNESS
[FreeTextEntry1] : Follow up\par \par Has been adherent to taking meds\par \par \par  [de-identified] : With oral iron the Hgb courtney 10.4 to 11.2 with [id number]\par Lack of energy\par Tired\par Renal OK\par No cp\par No SOB\par No palp

## 2019-05-21 ENCOUNTER — MEDICATION RENEWAL (OUTPATIENT)
Age: 79
End: 2019-05-21

## 2019-06-07 ENCOUNTER — APPOINTMENT (OUTPATIENT)
Dept: INTERNAL MEDICINE | Facility: CLINIC | Age: 79
End: 2019-06-07
Payer: MEDICARE

## 2019-06-07 VITALS
OXYGEN SATURATION: 95 % | RESPIRATION RATE: 16 BRPM | HEART RATE: 60 BPM | HEIGHT: 67 IN | WEIGHT: 169 LBS | BODY MASS INDEX: 26.53 KG/M2 | SYSTOLIC BLOOD PRESSURE: 120 MMHG | DIASTOLIC BLOOD PRESSURE: 68 MMHG

## 2019-06-07 PROCEDURE — 99214 OFFICE O/P EST MOD 30 MIN: CPT

## 2019-06-07 NOTE — HISTORY OF PRESENT ILLNESS
[FreeTextEntry1] : Saw Nikky who is monitorin warfarin dosing\par \par No new symptoms\par Edema of legs unchanged\par \par Recent BT  \par Hgb 11.8\par \par B -12 2000\par TSH 5.1\par Cre 1.32    BUN 42\par  [de-identified] : Stamina   unchanged\par Effort tolerance not improved\par

## 2019-07-15 ENCOUNTER — MEDICATION RENEWAL (OUTPATIENT)
Age: 79
End: 2019-07-15

## 2019-07-30 ENCOUNTER — MEDICATION RENEWAL (OUTPATIENT)
Age: 79
End: 2019-07-30

## 2019-08-01 ENCOUNTER — APPOINTMENT (OUTPATIENT)
Dept: CARDIOLOGY | Facility: CLINIC | Age: 79
End: 2019-08-01
Payer: MEDICARE

## 2019-08-01 VITALS — HEART RATE: 51 BPM | SYSTOLIC BLOOD PRESSURE: 120 MMHG | DIASTOLIC BLOOD PRESSURE: 72 MMHG

## 2019-08-01 PROCEDURE — 99204 OFFICE O/P NEW MOD 45 MIN: CPT

## 2019-08-01 NOTE — PHYSICAL EXAM
[General Appearance - Well Developed] : well developed [Well Groomed] : well groomed [Normal Appearance] : normal appearance [General Appearance - In No Acute Distress] : no acute distress [No Deformities] : no deformities [General Appearance - Well Nourished] : well nourished [Normal Conjunctiva] : the conjunctiva exhibited no abnormalities [Normal Oral Mucosa] : normal oral mucosa [Eyelids - No Xanthelasma] : the eyelids demonstrated no xanthelasmas [No Oral Pallor] : no oral pallor [No Oral Cyanosis] : no oral cyanosis [Normal Jugular Venous A Waves Present] : normal jugular venous A waves present [Normal Jugular Venous V Waves Present] : normal jugular venous V waves present [No Jugular Venous Shabazz A Waves] : no jugular venous shabazz A waves [Heart Sounds] : normal S1 and S2 [Respiration, Rhythm And Depth] : normal respiratory rhythm and effort [Exaggerated Use Of Accessory Muscles For Inspiration] : no accessory muscle use [Abdomen Soft] : soft [Auscultation Breath Sounds / Voice Sounds] : lungs were clear to auscultation bilaterally [Abdomen Tenderness] : non-tender [Abdomen Mass (___ Cm)] : no abdominal mass palpated [Abnormal Walk] : normal gait [Cyanosis, Localized] : no localized cyanosis [Nail Clubbing] : no clubbing of the fingernails [Petechial Hemorrhages (___cm)] : no petechial hemorrhages [] : no rash [Skin Color & Pigmentation] : normal skin color and pigmentation [No Venous Stasis] : no venous stasis [No Skin Ulcers] : no skin ulcer [Skin Lesions] : no skin lesions [Oriented To Time, Place, And Person] : oriented to person, place, and time [No Xanthoma] : no  xanthoma was observed [Affect] : the affect was normal [Mood] : the mood was normal [No Anxiety] : not feeling anxious [FreeTextEntry1] : Irregular, 2+ edema bilateral LE

## 2019-08-01 NOTE — REVIEW OF SYSTEMS
[Dyspnea on exertion] : dyspnea during exertion [Lower Ext Edema] : lower extremity edema [Negative] : Gastrointestinal [Fever] : no fever [Chills] : no chills [Shortness Of Breath] : no shortness of breath [Chest Pain] : no chest pain

## 2019-08-01 NOTE — HISTORY OF PRESENT ILLNESS
[FreeTextEntry1] : 79M with permanent AF, HFrEF, CAD with PCI, s/p CABG (2001), DM, mod-severe MR, hx of lymphoma with chemo and chest radiation (2009) who presents for a second opinion for LE edema.  Notes his legs swell up about once or twice a month and usually responds to extra diuretics (metolazone).  This time, been lasting for a week, and not responding as well as usual. Denies CP.  Occasional dyspnea with heavy exertion.  Denies dizziness, lightheadedness. His son recently passed away from cancer so he is under a lot of stress. \par \par Former smoker. Retired . Lives with wife. \par \par ECG: AF with PVC's\par TTE 7/2019: Mild-moderate global LV dysfunction, EF 40-45%. severe LAE, mod-severe MR, mil-mod TR, mod IA\par \par Furosemide 40mg BID, Eplerenone 25mg BID, Warfarin, Atorvastatin 40mg, Toprol 25mg,  Metolazone 5mg daily prn

## 2019-08-01 NOTE — DISCUSSION/SUMMARY
[FreeTextEntry1] : 79M with HFrEF, CAD s/p CABG, permanent AF on Coumadin, mod-severe MR, DM, with LE edema\par \par 1. HFrEF: Mild LE edema, lungs sound clear\par \par -Cont lasix 40mg BID, metolazone 5mg prn.  He notes his edmea is improving\par -Cont Toprol\par -Will ask primary cardiologist about ACEi and why he is not on one\par -Cont eplerenone, caution with kidney function, electrolytes\par -Close BMP follow up\par -Salt and fluid restriction, daily weights\par -LE elevation\par \par 2. Permanent AF:  Well maintained on Coumadin. Rate controlled. Cont Toprol\par \par -Will consider AF ablation given CASTLE-AF and other recent studies demonstrating benefit in this population.  Will discuss further with EP\par -Cont coumadin, Xarelto \par -INR checks with primary cardiologist\par \par 3. CAD: S/p CABG, PCI. Cont statin. Off asa given warfarin. \par \par 4. Mod-severe MR: Cont diuresis\par \par RV 1 month for reassessment of symptoms, BMP

## 2019-08-06 ENCOUNTER — APPOINTMENT (OUTPATIENT)
Dept: INTERNAL MEDICINE | Facility: CLINIC | Age: 79
End: 2019-08-06
Payer: MEDICARE

## 2019-08-06 VITALS
WEIGHT: 144 LBS | DIASTOLIC BLOOD PRESSURE: 74 MMHG | HEIGHT: 67 IN | OXYGEN SATURATION: 98 % | SYSTOLIC BLOOD PRESSURE: 117 MMHG | RESPIRATION RATE: 16 BRPM | BODY MASS INDEX: 22.6 KG/M2 | HEART RATE: 63 BPM

## 2019-08-06 DIAGNOSIS — I48.0 PAROXYSMAL ATRIAL FIBRILLATION: ICD-10-CM

## 2019-08-06 PROCEDURE — 99214 OFFICE O/P EST MOD 30 MIN: CPT

## 2019-08-06 NOTE — PHYSICAL EXAM
[No Acute Distress] : no acute distress [Well Developed] : well developed [Well Nourished] : well nourished [Normal Sclera/Conjunctiva] : normal sclera/conjunctiva [Well-Appearing] : well-appearing [PERRL] : pupils equal round and reactive to light [EOMI] : extraocular movements intact [Normal Oropharynx] : the oropharynx was normal [Normal Outer Ear/Nose] : the outer ears and nose were normal in appearance [No Lymphadenopathy] : no lymphadenopathy [No JVD] : no jugular venous distention [Thyroid Normal, No Nodules] : the thyroid was normal and there were no nodules present [Supple] : supple [No Respiratory Distress] : no respiratory distress  [No Accessory Muscle Use] : no accessory muscle use [Normal Rate] : normal rate  [Clear to Auscultation] : lungs were clear to auscultation bilaterally [Normal S1, S2] : normal S1 and S2 [No Murmur] : no murmur heard [No Carotid Bruits] : no carotid bruits [No Abdominal Bruit] : a ~M bruit was not heard ~T in the abdomen [No Varicosities] : no varicosities [Pedal Pulses Present] : the pedal pulses are present [No Edema] : there was no peripheral edema [No Palpable Aorta] : no palpable aorta [Soft] : abdomen soft [No Extremity Clubbing/Cyanosis] : no extremity clubbing/cyanosis [Non Tender] : non-tender [Non-distended] : non-distended [No Masses] : no abdominal mass palpated [No HSM] : no HSM [Normal Bowel Sounds] : normal bowel sounds [Normal Posterior Cervical Nodes] : no posterior cervical lymphadenopathy [Normal Anterior Cervical Nodes] : no anterior cervical lymphadenopathy [No CVA Tenderness] : no CVA  tenderness [No Spinal Tenderness] : no spinal tenderness [No Joint Swelling] : no joint swelling [Grossly Normal Strength/Tone] : grossly normal strength/tone [No Rash] : no rash [Coordination Grossly Intact] : coordination grossly intact [No Focal Deficits] : no focal deficits [Normal Gait] : normal gait [Deep Tendon Reflexes (DTR)] : deep tendon reflexes were 2+ and symmetric [Normal Insight/Judgement] : insight and judgment were intact [Normal Affect] : the affect was normal [de-identified] : AF

## 2019-08-06 NOTE — ASSESSMENT
[FreeTextEntry1] : We have previously discussed consideration for ablation:  he declined at that time \par Wants to reconsider ablation\par To F/U with [facility]el\par \par Too soon to assess thyroid replacement dose

## 2019-08-06 NOTE — PLAN
[FreeTextEntry1] : F/U here in 2 m (re thyroid)\par F/U with cardio re consideration for ablation.... leading to a chance he could come off AC "soon" thereafter

## 2019-08-06 NOTE — HISTORY OF PRESENT ILLNESS
[de-identified] : DEE Sher   periodic INR    reported to him as in therapeutic range  \par \par  [FreeTextEntry1] : Follow up\par \par Started LTX\par Tolerated well\par No further reduction of edema of feet\par \par Renal function:  Cre 1.32

## 2019-08-12 ENCOUNTER — RX RENEWAL (OUTPATIENT)
Age: 79
End: 2019-08-12

## 2019-10-16 ENCOUNTER — APPOINTMENT (OUTPATIENT)
Dept: INTERNAL MEDICINE | Facility: CLINIC | Age: 79
End: 2019-10-16

## 2019-11-13 ENCOUNTER — APPOINTMENT (OUTPATIENT)
Dept: ENDOCRINOLOGY | Facility: CLINIC | Age: 79
End: 2019-11-13
Payer: MEDICARE

## 2019-11-13 VITALS
DIASTOLIC BLOOD PRESSURE: 60 MMHG | WEIGHT: 158 LBS | BODY MASS INDEX: 24.8 KG/M2 | HEIGHT: 67 IN | SYSTOLIC BLOOD PRESSURE: 107 MMHG

## 2019-11-13 LAB — GLUCOSE BLDC GLUCOMTR-MCNC: 171

## 2019-11-13 PROCEDURE — 95251 CONT GLUC MNTR ANALYSIS I&R: CPT

## 2019-11-13 PROCEDURE — 95250 CONT GLUC MNTR PHYS/QHP EQP: CPT

## 2019-11-13 PROCEDURE — 99204 OFFICE O/P NEW MOD 45 MIN: CPT | Mod: 25

## 2019-11-13 PROCEDURE — 36415 COLL VENOUS BLD VENIPUNCTURE: CPT

## 2019-11-13 NOTE — ASSESSMENT
[Carbohydrate Consistent Diet] : carbohydrate consistent diet [Diabetes Foot Care] : diabetes foot care [Self Monitoring of Blood Glucose] : self monitoring of blood glucose [Hypoglycemia Management] : hypoglycemia management [FreeTextEntry1] : Current approaches to diabetes management are discussed with the patient. \par Target ranges for blood sugar, blood pressure and cholesterol reviewed, and risk reduction strategies verified. \par Hypoglycemia precautions reviewed with the patient. \par Suggested extensive diabetes education program, including nutritional and diabetes teaching and evaluation. \par Proper dietary restrictions and exercise routines discussed. \par Glucometer/SMBG and log book charting discussed.\par - check a1c, CMP, urine microalbumin today\par - Malini PRO\par - if needs any OHG agent, will likely to start on DPP4 inhibitors.\par - advised on possible worsening of glycemic control post cardiac surgery, that may require a temporary insulin treatment\par - in regards of his subclinical hypothyroidism, we'll repeat his levels along with antibodies. If TSH remains in prior range, we'll continue with close monitoring only. Otherwise, will  resume a small dose of L-thyroxine with slow uptitration.\par - patient will return the sensor prior to the surgery and will discuss his results over the phone. Follow up next month, or sooner prn

## 2019-11-13 NOTE — HISTORY OF PRESENT ILLNESS
[FreeTextEntry1] : HISTORY OF PRESENT ILLNESS. \par \par Mr. GAITAN was diagnosed with Diabetes Mellitus Type 2 in 1995. It was diet controlled for many years, but is concerned that blood sugar is higher recently. He reports history dyslipidemia, AFib, HFrEF, CAD (s/p PTCI and s/p CABG in 2001), moderate-severe MR, lymphoma (s/p chemotherapy and chest radiation in 2009), unilateral renal agenesis. He's scheduled for MV repair in about 2-3 weeks in [facility]. He was briefly on L-thyroxine 75 mcg for YANETH, but stopped about 1 month ago, because did not feel any difference in his fatigue.\par He denies known complications of retinopathy, nephropathy, or neuropathy. \par Blood sugars are checked 3 times a week. \par Did not bring log book, but reported are typically as following: FBS- 130's, ppg- not checking \par Fingerstick glucose in the office today is 171 mg/dL 4 hours after eating. \par Diet: not following ADA\par Exercise: none\par \par Last dilated eye - many years ago\par Last podiatry visit  - none\par Last cardiology evaluation - 9/19\par Last stress test - 2018\par Last 2-D Echo -2019\par Last nephrology evaluation - none\par Last neurology evaluation- none\par \par Lab review: a1c- 6.2\par TSH from 02/19 was 5.44, creatinine- 1.24\par

## 2019-11-15 LAB
25(OH)D3 SERPL-MCNC: 43.1 NG/ML
ALBUMIN SERPL ELPH-MCNC: 4.2 G/DL
ALP BLD-CCNC: 152 U/L
ALT SERPL-CCNC: 43 U/L
ANION GAP SERPL CALC-SCNC: 16 MMOL/L
AST SERPL-CCNC: 51 U/L
BILIRUB SERPL-MCNC: 0.6 MG/DL
BUN SERPL-MCNC: 34 MG/DL
C PEPTIDE SERPL-MCNC: 9.2 NG/ML
CALCIUM SERPL-MCNC: 9.3 MG/DL
CHLORIDE SERPL-SCNC: 98 MMOL/L
CHOLEST SERPL-MCNC: 151 MG/DL
CHOLEST/HDLC SERPL: 2.8 RATIO
CO2 SERPL-SCNC: 25 MMOL/L
CREAT SERPL-MCNC: 1.26 MG/DL
CREAT SPEC-SCNC: 33 MG/DL
ESTIMATED AVERAGE GLUCOSE: 154 MG/DL
FRUCTOSAMINE SERPL-MCNC: 274 UMOL/L
GLUCOSE SERPL-MCNC: 115 MG/DL
GLYCOMARK.: 16.1 UG/ML
HBA1C MFR BLD HPLC: 7 %
HDLC SERPL-MCNC: 54 MG/DL
LDLC SERPL CALC-MCNC: 73 MG/DL
MICROALBUMIN 24H UR DL<=1MG/L-MCNC: 2.8 MG/DL
MICROALBUMIN/CREAT 24H UR-RTO: 85 MG/G
POTASSIUM SERPL-SCNC: 4.6 MMOL/L
PROT SERPL-MCNC: 7.4 G/DL
SODIUM SERPL-SCNC: 139 MMOL/L
T4 FREE SERPL-MCNC: 1.1 NG/DL
THYROGLOB AB SERPL-ACNC: <20 IU/ML
THYROPEROXIDASE AB SERPL IA-ACNC: 27.1 IU/ML
TRIGL SERPL-MCNC: 121 MG/DL
TSH SERPL-ACNC: 5.78 UIU/ML
VIT B12 SERPL-MCNC: >2000 PG/ML

## 2020-02-06 ENCOUNTER — APPOINTMENT (OUTPATIENT)
Dept: ENDOCRINOLOGY | Facility: CLINIC | Age: 80
End: 2020-02-06
Payer: MEDICARE

## 2020-02-06 VITALS
DIASTOLIC BLOOD PRESSURE: 50 MMHG | OXYGEN SATURATION: 99 % | RESPIRATION RATE: 16 BRPM | WEIGHT: 162 LBS | BODY MASS INDEX: 25.43 KG/M2 | HEIGHT: 67 IN | HEART RATE: 83 BPM | SYSTOLIC BLOOD PRESSURE: 110 MMHG

## 2020-02-06 DIAGNOSIS — R74.8 ABNORMAL LEVELS OF OTHER SERUM ENZYMES: ICD-10-CM

## 2020-02-06 LAB — GLUCOSE BLDC GLUCOMTR-MCNC: 118

## 2020-02-06 PROCEDURE — 82962 GLUCOSE BLOOD TEST: CPT

## 2020-02-06 PROCEDURE — 36415 COLL VENOUS BLD VENIPUNCTURE: CPT

## 2020-02-06 PROCEDURE — 99214 OFFICE O/P EST MOD 30 MIN: CPT | Mod: 25

## 2020-02-06 NOTE — ASSESSMENT
[Carbohydrate Consistent Diet] : carbohydrate consistent diet [Hypoglycemia Management] : hypoglycemia management [Diabetes Foot Care] : diabetes foot care [Self Monitoring of Blood Glucose] : self monitoring of blood glucose [FreeTextEntry1] : -  a1c, CMP, urine microalbumin today\par - repeat \par - cont tradjenta 5 mg qd for now\par - advised on possible worsening of glycemic control post cardiac surgery, that may require a temporary insulin treatment\par - in regards of his subclinical hypothyroidism, we'll repeat his levels along with antibodies. If TSH remains in prior range, we'll continue with close monitoring only. Otherwise, will  resume a small dose of L-thyroxine with slow uptitration.\par - check BS-ALP, vitamin D. if elevated bone fraction, will refer for bone scan\par - screening DXA 3 sites\par RTC 3 mos, or sooner prn

## 2020-02-06 NOTE — HISTORY OF PRESENT ILLNESS
[FreeTextEntry1] : F/u for diabetes management\par \par *** Feb 06, 2020 ***\par \par s/p MVR, doing well\par on tradjenta 5mg\par reports fbs in low 100's\par \par last a1c- 7.0\par (+) microalbuminuria\par LDL- 73\par ALP- 152, AST/ALT- 51/43 with vit D- 43\par TSH- 5.78 <-- 5.44\par normal FT4. neg tpo/tg ab\par \par HISTORY OF PRESENT ILLNESS. \par \par Mr. GAITAN was diagnosed with Diabetes Mellitus Type 2 in 1995. It was diet controlled for many years, but is concerned that blood sugar is higher recently. He reports history dyslipidemia, AFib, HFrEF, CAD (s/p PTCI and s/p CABG in 2001), moderate-severe MR, lymphoma (s/p chemotherapy and chest radiation in 2009), unilateral renal agenesis. He's scheduled for MV repair in about 2-3 weeks in [facility]. He was briefly on L-thyroxine 75 mcg for YANETH, but stopped about 1 month ago, because did not feel any difference in his fatigue.\par He denies known complications of retinopathy, nephropathy, or neuropathy. \par Blood sugars are checked 3 times a week. \par Did not bring log book, but reported are typically as following: FBS- 130's, ppg- not checking \par Fingerstick glucose in the office today is 171 mg/dL 4 hours after eating. \par Diet: not following ADA\par Exercise: none\par \par Last dilated eye - many years ago\par Last podiatry visit  - none\par Last cardiology evaluation - 9/19\par Last stress test - 2018\par Last 2-D Echo -2019\par Last nephrology evaluation - none\par Last neurology evaluation- none\par \par Lab review: a1c- 6.2\par TSH from 02/19 was 5.44, creatinine- 1.24\par

## 2020-02-07 LAB
25(OH)D3 SERPL-MCNC: 45.8 NG/ML
ALP BONE SERPL-MCNC: 11 MCG/L
ESTIMATED AVERAGE GLUCOSE: 140 MG/DL
HBA1C MFR BLD HPLC: 6.5 %
T4 FREE SERPL-MCNC: 1.1 NG/DL
TSH SERPL-ACNC: 6.18 UIU/ML

## 2020-02-13 DIAGNOSIS — R77.9 ABNORMALITY OF PLASMA PROTEIN, UNSPECIFIED: ICD-10-CM

## 2020-02-13 LAB
ALBUMIN SERPL ELPH-MCNC: 4.8 G/DL
ALKPISO INTERP: NORMAL
ALP BLD-CCNC: 107 U/L
ALP BLD-CCNC: 91 U/L
ALP BONE CFR SERPL: 25 %
ALP INTEST CFR SERPL: 17 %
ALP LIVER CFR SERPL: 57 %
ALP MACRO CFR SERPL: 0 %
ALP PLAC CFR SERPL: 0 %
ALT SERPL-CCNC: 18 U/L
ANION GAP SERPL CALC-SCNC: 23 MMOL/L
AST SERPL-CCNC: 31 U/L
BILIRUB SERPL-MCNC: 0.4 MG/DL
BUN SERPL-MCNC: 25 MG/DL
CALCIUM SERPL-MCNC: 10.3 MG/DL
CHLORIDE SERPL-SCNC: 97 MMOL/L
CHOLEST SERPL-MCNC: 172 MG/DL
CHOLEST/HDLC SERPL: 2.8 RATIO
CO2 SERPL-SCNC: 19 MMOL/L
CREAT SERPL-MCNC: 1.39 MG/DL
GLUCOSE SERPL-MCNC: 103 MG/DL
HDLC SERPL-MCNC: 62 MG/DL
LDLC SERPL CALC-MCNC: 89 MG/DL
POTASSIUM SERPL-SCNC: 5.7 MMOL/L
PROT SERPL-MCNC: 8.5 G/DL
SODIUM SERPL-SCNC: 140 MMOL/L
TRIGL SERPL-MCNC: 105 MG/DL

## 2020-03-26 RX ORDER — BLOOD SUGAR DIAGNOSTIC
STRIP MISCELLANEOUS DAILY
Qty: 1 | Refills: 0 | Status: ACTIVE | COMMUNITY
Start: 2020-03-26 | End: 1900-01-01

## 2020-06-03 ENCOUNTER — RX RENEWAL (OUTPATIENT)
Age: 80
End: 2020-06-03

## 2020-06-05 ENCOUNTER — APPOINTMENT (OUTPATIENT)
Dept: ENDOCRINOLOGY | Facility: CLINIC | Age: 80
End: 2020-06-05

## 2020-06-10 ENCOUNTER — TRANSCRIPTION ENCOUNTER (OUTPATIENT)
Age: 80
End: 2020-06-10

## 2020-09-16 ENCOUNTER — APPOINTMENT (OUTPATIENT)
Dept: INTERNAL MEDICINE | Facility: CLINIC | Age: 80
End: 2020-09-16

## 2021-01-28 ENCOUNTER — APPOINTMENT (OUTPATIENT)
Dept: INTERNAL MEDICINE | Facility: CLINIC | Age: 81
End: 2021-01-28
Payer: MEDICARE

## 2021-01-28 VITALS — OXYGEN SATURATION: 99 %

## 2021-01-28 VITALS
RESPIRATION RATE: 16 BRPM | HEART RATE: 69 BPM | HEIGHT: 67 IN | WEIGHT: 170 LBS | SYSTOLIC BLOOD PRESSURE: 150 MMHG | BODY MASS INDEX: 26.68 KG/M2 | DIASTOLIC BLOOD PRESSURE: 74 MMHG

## 2021-01-28 PROCEDURE — 36415 COLL VENOUS BLD VENIPUNCTURE: CPT

## 2021-01-28 PROCEDURE — G0444 DEPRESSION SCREEN ANNUAL: CPT | Mod: 59

## 2021-01-28 PROCEDURE — 99205 OFFICE O/P NEW HI 60 MIN: CPT | Mod: 25

## 2021-01-28 PROCEDURE — G0442 ANNUAL ALCOHOL SCREEN 15 MIN: CPT | Mod: 59

## 2021-01-28 PROCEDURE — 99072 ADDL SUPL MATRL&STAF TM PHE: CPT

## 2021-01-28 RX ORDER — METOPROLOL SUCCINATE 25 MG/1
25 TABLET, EXTENDED RELEASE ORAL DAILY
Qty: 90 | Refills: 3 | Status: DISCONTINUED | COMMUNITY
End: 2021-01-28

## 2021-01-28 RX ORDER — EPLERENONE 25 MG/1
25 TABLET, COATED ORAL
Qty: 180 | Refills: 3 | Status: DISCONTINUED | COMMUNITY
End: 2021-01-28

## 2021-01-28 RX ORDER — SPIRONOLACTONE 25 MG/1
25 TABLET ORAL
Refills: 0 | Status: DISCONTINUED | COMMUNITY
End: 2021-01-28

## 2021-01-28 RX ORDER — METOLAZONE 5 MG/1
5 TABLET ORAL DAILY
Refills: 0 | Status: DISCONTINUED | COMMUNITY
Start: 2019-08-06 | End: 2021-01-28

## 2021-01-29 DIAGNOSIS — E87.5 HYPERKALEMIA: ICD-10-CM

## 2021-01-29 LAB
25(OH)D3 SERPL-MCNC: 48 NG/ML
ALBUMIN SERPL ELPH-MCNC: 4.1 G/DL
ALP BLD-CCNC: 101 U/L
ALT SERPL-CCNC: 21 U/L
ANION GAP SERPL CALC-SCNC: 15 MMOL/L
AST SERPL-CCNC: 28 U/L
BASOPHILS # BLD AUTO: 0.04 K/UL
BASOPHILS NFR BLD AUTO: 0.6 %
BILIRUB SERPL-MCNC: 0.4 MG/DL
BUN SERPL-MCNC: 34 MG/DL
CALCIUM SERPL-MCNC: 9.8 MG/DL
CHLORIDE SERPL-SCNC: 100 MMOL/L
CHOLEST SERPL-MCNC: 150 MG/DL
CK SERPL-CCNC: 113 U/L
CO2 SERPL-SCNC: 22 MMOL/L
CREAT SERPL-MCNC: 1.36 MG/DL
EOSINOPHIL # BLD AUTO: 0.15 K/UL
EOSINOPHIL NFR BLD AUTO: 2.1 %
ESTIMATED AVERAGE GLUCOSE: 143 MG/DL
GLUCOSE SERPL-MCNC: 120 MG/DL
HBA1C MFR BLD HPLC: 6.6 %
HCT VFR BLD CALC: 36.6 %
HDLC SERPL-MCNC: 52 MG/DL
HGB BLD-MCNC: 11.5 G/DL
IMM GRANULOCYTES NFR BLD AUTO: 0.1 %
LDLC SERPL CALC-MCNC: 80 MG/DL
LYMPHOCYTES # BLD AUTO: 0.62 K/UL
LYMPHOCYTES NFR BLD AUTO: 8.8 %
MAN DIFF?: NORMAL
MCHC RBC-ENTMCNC: 30.2 PG
MCHC RBC-ENTMCNC: 31.4 GM/DL
MCV RBC AUTO: 96.1 FL
MONOCYTES # BLD AUTO: 0.87 K/UL
MONOCYTES NFR BLD AUTO: 12.4 %
NEUTROPHILS # BLD AUTO: 5.35 K/UL
NEUTROPHILS NFR BLD AUTO: 76 %
NONHDLC SERPL-MCNC: 98 MG/DL
PLATELET # BLD AUTO: 208 K/UL
POTASSIUM SERPL-SCNC: 6.2 MMOL/L
PROT SERPL-MCNC: 7.4 G/DL
RBC # BLD: 3.81 M/UL
RBC # FLD: 15.3 %
SODIUM SERPL-SCNC: 137 MMOL/L
T4 FREE SERPL-MCNC: 1.1 NG/DL
T4 SERPL-MCNC: 6.2 UG/DL
TRIGL SERPL-MCNC: 92 MG/DL
TSH SERPL-ACNC: 4.85 UIU/ML
VIT B12 SERPL-MCNC: >2000 PG/ML
WBC # FLD AUTO: 7.04 K/UL

## 2021-02-05 NOTE — HEALTH RISK ASSESSMENT
[Good] : ~his/her~  mood as  good [Yes] : Yes [2 - 3 times a week (3 pts)] : 2 - 3  times a week (3 points) [1 or 2 (0 pts)] : 1 or 2 (0 points) [No] : In the past 12 months have you used drugs other than those required for medical reasons? No [No falls in past year] : Patient reported no falls in the past year [0] : 2) Feeling down, depressed, or hopeless: Not at all (0) [No Retinopathy] : No retinopathy [HIV test declined] : HIV test declined [Hepatitis C test declined] : Hepatitis C test declined [None] : None [Retired] : retired [] :  [Feels Safe at Home] : Feels safe at home [Fully functional (bathing, dressing, toileting, transferring, walking, feeding)] : Fully functional (bathing, dressing, toileting, transferring, walking, feeding) [Fully functional (using the telephone, shopping, preparing meals, housekeeping, doing laundry, using] : Fully functional and needs no help or supervision to perform IADLs (using the telephone, shopping, preparing meals, housekeeping, doing laundry, using transportation, managing medications and managing finances) [Reports changes in dental health] : Reports changes in dental health [Smoke Detector] : smoke detector [Carbon Monoxide Detector] : carbon monoxide detector [Seat Belt] :  uses seat belt [Sunscreen] : uses sunscreen [] : No [Audit-CScore] : 3 [HKN1Dqirn] : 0 [EyeExamDate] : 2020 [Change in mental status noted] : No change in mental status noted [Reports changes in hearing] : Reports no changes in hearing [Reports changes in vision] : Reports no changes in vision [FreeTextEntry2] :

## 2021-02-05 NOTE — PHYSICAL EXAM
[No Acute Distress] : no acute distress [Well Nourished] : well nourished [Well Developed] : well developed [Well-Appearing] : well-appearing [Normal Sclera/Conjunctiva] : normal sclera/conjunctiva [PERRL] : pupils equal round and reactive to light [EOMI] : extraocular movements intact [Normal Outer Ear/Nose] : the outer ears and nose were normal in appearance [Normal Oropharynx] : the oropharynx was normal [No JVD] : no jugular venous distention [No Lymphadenopathy] : no lymphadenopathy [Supple] : supple [Thyroid Normal, No Nodules] : the thyroid was normal and there were no nodules present [No Respiratory Distress] : no respiratory distress  [No Accessory Muscle Use] : no accessory muscle use [Clear to Auscultation] : lungs were clear to auscultation bilaterally [Normal Rate] : normal rate  [Normal S1, S2] : normal S1 and S2 [No Murmur] : no murmur heard [No Carotid Bruits] : no carotid bruits [No Abdominal Bruit] : a ~M bruit was not heard ~T in the abdomen [No Varicosities] : no varicosities [Pedal Pulses Present] : the pedal pulses are present [No Edema] : there was no peripheral edema [No Palpable Aorta] : no palpable aorta [No Extremity Clubbing/Cyanosis] : no extremity clubbing/cyanosis [Soft] : abdomen soft [Non Tender] : non-tender [Non-distended] : non-distended [No Masses] : no abdominal mass palpated [No HSM] : no HSM [Normal Bowel Sounds] : normal bowel sounds [Normal Posterior Cervical Nodes] : no posterior cervical lymphadenopathy [Normal Anterior Cervical Nodes] : no anterior cervical lymphadenopathy [No CVA Tenderness] : no CVA  tenderness [No Spinal Tenderness] : no spinal tenderness [No Joint Swelling] : no joint swelling [Grossly Normal Strength/Tone] : grossly normal strength/tone [No Rash] : no rash [Coordination Grossly Intact] : coordination grossly intact [No Focal Deficits] : no focal deficits [Normal Gait] : normal gait [Deep Tendon Reflexes (DTR)] : deep tendon reflexes were 2+ and symmetric [Normal Affect] : the affect was normal [Normal Insight/Judgement] : insight and judgment were intact [de-identified] : irregular [de-identified] : gynecomastia
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
## 2021-02-23 LAB
ANION GAP SERPL CALC-SCNC: 12 MMOL/L
BUN SERPL-MCNC: 24 MG/DL
CALCIUM SERPL-MCNC: 9.8 MG/DL
CHLORIDE SERPL-SCNC: 98 MMOL/L
CO2 SERPL-SCNC: 24 MMOL/L
CREAT SERPL-MCNC: 1.29 MG/DL
GLUCOSE SERPL-MCNC: 124 MG/DL
POTASSIUM SERPL-SCNC: 5.1 MMOL/L
SODIUM SERPL-SCNC: 135 MMOL/L

## 2021-05-05 ENCOUNTER — APPOINTMENT (OUTPATIENT)
Dept: INTERNAL MEDICINE | Facility: CLINIC | Age: 81
End: 2021-05-05
Payer: MEDICARE

## 2021-05-05 ENCOUNTER — NON-APPOINTMENT (OUTPATIENT)
Age: 81
End: 2021-05-05

## 2021-05-05 VITALS
OXYGEN SATURATION: 98 % | WEIGHT: 166 LBS | HEIGHT: 67 IN | BODY MASS INDEX: 26.06 KG/M2 | HEART RATE: 74 BPM | TEMPERATURE: 97.8 F | SYSTOLIC BLOOD PRESSURE: 130 MMHG | DIASTOLIC BLOOD PRESSURE: 67 MMHG

## 2021-05-05 DIAGNOSIS — N62 HYPERTROPHY OF BREAST: ICD-10-CM

## 2021-05-05 DIAGNOSIS — N13.8 BENIGN PROSTATIC HYPERPLASIA WITH LOWER URINARY TRACT SYMPMS: ICD-10-CM

## 2021-05-05 DIAGNOSIS — N40.1 BENIGN PROSTATIC HYPERPLASIA WITH LOWER URINARY TRACT SYMPMS: ICD-10-CM

## 2021-05-05 DIAGNOSIS — E53.8 DEFICIENCY OF OTHER SPECIFIED B GROUP VITAMINS: ICD-10-CM

## 2021-05-05 PROCEDURE — 36415 COLL VENOUS BLD VENIPUNCTURE: CPT

## 2021-05-05 PROCEDURE — G0439: CPT

## 2021-05-05 PROCEDURE — 93000 ELECTROCARDIOGRAM COMPLETE: CPT | Mod: 59

## 2021-05-05 RX ORDER — EPLERENONE 25 MG/1
25 TABLET, COATED ORAL
Refills: 0 | Status: ACTIVE | COMMUNITY

## 2021-05-05 NOTE — HEALTH RISK ASSESSMENT
[Good] : ~his/her~  mood as  good [] : No [Yes] : Yes [2 - 4 times a month (2 pts)] : 2-4 times a month (2 points) [1 or 2 (0 pts)] : 1 or 2 (0 points) [Never (0 pts)] : Never (0 points) [No] : In the past 12 months have you used drugs other than those required for medical reasons? No [No falls in past year] : Patient reported no falls in the past year [0] : 2) Feeling down, depressed, or hopeless: Not at all (0) [Audit-CScore] : 2 [HYL7Oscpy] : 0 [No Retinopathy] : No retinopathy [EyeExamDate] : 2020 [Patient declined colonoscopy] : Patient declined colonoscopy [Hepatitis C test declined] : Hepatitis C test declined [Change in mental status noted] : No change in mental status noted [None] : None [With Significant Other] : lives with significant other [Retired] : retired [] :  [# Of Children ___] : has [unfilled] children [Feels Safe at Home] : Feels safe at home [Fully functional (bathing, dressing, toileting, transferring, walking, feeding)] : Fully functional (bathing, dressing, toileting, transferring, walking, feeding) [Fully functional (using the telephone, shopping, preparing meals, housekeeping, doing laundry, using] : Fully functional and needs no help or supervision to perform IADLs (using the telephone, shopping, preparing meals, housekeeping, doing laundry, using transportation, managing medications and managing finances) [Reports changes in hearing] : Reports no changes in hearing [Reports changes in vision] : Reports no changes in vision [Reports changes in dental health] : Reports no changes in dental health [Smoke Detector] : smoke detector [Carbon Monoxide Detector] : carbon monoxide detector [Seat Belt] :  uses seat belt [Sunscreen] : uses sunscreen [FreeTextEntry2] :  [FreeTextEntry3] : 6 grandchildren

## 2021-05-05 NOTE — PHYSICAL EXAM
[No Acute Distress] : no acute distress [Well Nourished] : well nourished [Well Developed] : well developed [Well-Appearing] : well-appearing [Normal Sclera/Conjunctiva] : normal sclera/conjunctiva [PERRL] : pupils equal round and reactive to light [EOMI] : extraocular movements intact [Normal Outer Ear/Nose] : the outer ears and nose were normal in appearance [Normal Oropharynx] : the oropharynx was normal [No JVD] : no jugular venous distention [No Lymphadenopathy] : no lymphadenopathy [Supple] : supple [Thyroid Normal, No Nodules] : the thyroid was normal and there were no nodules present [No Respiratory Distress] : no respiratory distress  [No Accessory Muscle Use] : no accessory muscle use [Clear to Auscultation] : lungs were clear to auscultation bilaterally [Normal Rate] : normal rate  [Regular Rhythm] : with a regular rhythm [Normal S1, S2] : normal S1 and S2 [No Murmur] : no murmur heard [No Carotid Bruits] : no carotid bruits [No Abdominal Bruit] : a ~M bruit was not heard ~T in the abdomen [No Varicosities] : no varicosities [Pedal Pulses Present] : the pedal pulses are present [No Edema] : there was no peripheral edema [No Palpable Aorta] : no palpable aorta [No Extremity Clubbing/Cyanosis] : no extremity clubbing/cyanosis [Soft] : abdomen soft [Non Tender] : non-tender [Non-distended] : non-distended [No Masses] : no abdominal mass palpated [No HSM] : no HSM [Normal Bowel Sounds] : normal bowel sounds [Normal Posterior Cervical Nodes] : no posterior cervical lymphadenopathy [Normal Anterior Cervical Nodes] : no anterior cervical lymphadenopathy [No CVA Tenderness] : no CVA  tenderness [No Spinal Tenderness] : no spinal tenderness [No Joint Swelling] : no joint swelling [Grossly Normal Strength/Tone] : grossly normal strength/tone [No Rash] : no rash [Coordination Grossly Intact] : coordination grossly intact [No Focal Deficits] : no focal deficits [Normal Gait] : normal gait [Deep Tendon Reflexes (DTR)] : deep tendon reflexes were 2+ and symmetric [Normal Affect] : the affect was normal [Normal Insight/Judgement] : insight and judgment were intact [de-identified] : decreased breath sounds on left side [de-identified] : umbilical hernia [de-identified] : large lipoma left side upper back

## 2021-05-05 NOTE — HISTORY OF PRESENT ILLNESS
[FreeTextEntry1] : Stopped spirinolactone and breast feel better and back to normalseeing Cardiologist Dr. fermín moya for AfibHe has stopped thyroid medication - was losing his hair\par  on no diabetes meds

## 2021-05-06 LAB
25(OH)D3 SERPL-MCNC: 50.5 NG/ML
ALBUMIN SERPL ELPH-MCNC: 4.2 G/DL
ALP BLD-CCNC: 123 U/L
ALT SERPL-CCNC: 17 U/L
ANION GAP SERPL CALC-SCNC: 13 MMOL/L
APPEARANCE: CLEAR
AST SERPL-CCNC: 27 U/L
BASOPHILS # BLD AUTO: 0.04 K/UL
BASOPHILS NFR BLD AUTO: 0.7 %
BILIRUB SERPL-MCNC: 0.6 MG/DL
BILIRUBIN URINE: NEGATIVE
BLOOD URINE: NEGATIVE
BUN SERPL-MCNC: 32 MG/DL
CALCIUM SERPL-MCNC: 9.5 MG/DL
CHLORIDE SERPL-SCNC: 100 MMOL/L
CHOLEST SERPL-MCNC: 133 MG/DL
CK SERPL-CCNC: 99 U/L
CO2 SERPL-SCNC: 24 MMOL/L
COLOR: NORMAL
CREAT SERPL-MCNC: 1.35 MG/DL
CREAT SPEC-SCNC: 49 MG/DL
EOSINOPHIL # BLD AUTO: 0.09 K/UL
EOSINOPHIL NFR BLD AUTO: 1.5 %
ESTIMATED AVERAGE GLUCOSE: 137 MG/DL
FERRITIN SERPL-MCNC: 54 NG/ML
FOLATE SERPL-MCNC: >20 NG/ML
GLUCOSE QUALITATIVE U: NEGATIVE
GLUCOSE SERPL-MCNC: 111 MG/DL
HBA1C MFR BLD HPLC: 6.4 %
HCT VFR BLD CALC: 32.7 %
HDLC SERPL-MCNC: 51 MG/DL
HGB BLD-MCNC: 10.4 G/DL
IMM GRANULOCYTES NFR BLD AUTO: 0.3 %
IRON SATN MFR SERPL: 9 %
IRON SERPL-MCNC: 38 UG/DL
KETONES URINE: NEGATIVE
LDLC SERPL CALC-MCNC: 61 MG/DL
LEUKOCYTE ESTERASE URINE: NEGATIVE
LYMPHOCYTES # BLD AUTO: 0.56 K/UL
LYMPHOCYTES NFR BLD AUTO: 9.4 %
MAN DIFF?: NORMAL
MCHC RBC-ENTMCNC: 28.7 PG
MCHC RBC-ENTMCNC: 31.8 GM/DL
MCV RBC AUTO: 90.3 FL
MICROALBUMIN 24H UR DL<=1MG/L-MCNC: 10.1 MG/DL
MICROALBUMIN/CREAT 24H UR-RTO: 207 MG/G
MONOCYTES # BLD AUTO: 0.95 K/UL
MONOCYTES NFR BLD AUTO: 15.9 %
NEUTROPHILS # BLD AUTO: 4.32 K/UL
NEUTROPHILS NFR BLD AUTO: 72.2 %
NITRITE URINE: NEGATIVE
NONHDLC SERPL-MCNC: 82 MG/DL
PH URINE: 6
PLATELET # BLD AUTO: 189 K/UL
POTASSIUM SERPL-SCNC: 4.8 MMOL/L
PROT SERPL-MCNC: 7.6 G/DL
PROTEIN URINE: NORMAL
RBC # BLD: 3.62 M/UL
RBC # FLD: 18.1 %
SODIUM SERPL-SCNC: 138 MMOL/L
SPECIFIC GRAVITY URINE: 1.01
T4 FREE SERPL-MCNC: 1.1 NG/DL
T4 SERPL-MCNC: 5.4 UG/DL
TIBC SERPL-MCNC: 416 UG/DL
TRIGL SERPL-MCNC: 103 MG/DL
TSH SERPL-ACNC: 8.03 UIU/ML
UIBC SERPL-MCNC: 378 UG/DL
UROBILINOGEN URINE: NORMAL
VIT B12 SERPL-MCNC: >2000 PG/ML
WBC # FLD AUTO: 5.98 K/UL

## 2021-05-28 ENCOUNTER — APPOINTMENT (OUTPATIENT)
Dept: PULMONOLOGY | Facility: CLINIC | Age: 81
End: 2021-05-28
Payer: MEDICARE

## 2021-05-28 VITALS
DIASTOLIC BLOOD PRESSURE: 60 MMHG | HEIGHT: 67 IN | WEIGHT: 165 LBS | OXYGEN SATURATION: 98 % | BODY MASS INDEX: 25.9 KG/M2 | SYSTOLIC BLOOD PRESSURE: 118 MMHG | HEART RATE: 71 BPM

## 2021-05-28 PROCEDURE — 99204 OFFICE O/P NEW MOD 45 MIN: CPT

## 2021-05-28 NOTE — CONSULT LETTER
[Dear  ___] : Dear  [unfilled], [Consult Letter:] : I had the pleasure of evaluating your patient, [unfilled]. [Please see my note below.] : Please see my note below. [Consult Closing:] : Thank you very much for allowing me to participate in the care of this patient.  If you have any questions, please do not hesitate to contact me. [Sincerely,] : Sincerely, [FreeTextEntry1] : / [FreeTextEntry3] : Yours truly,\par \par Barrington Echevarria MD

## 2021-05-28 NOTE — ASSESSMENT
[FreeTextEntry1] : \par \par _____________________\par PATIENT SUMMARY.\par ________________________\par      \par INITIAL VISIT.   2021         \par 81 m follows with Dr Sher (Cardio) for CHF and lately developed worsening dyspnea He was found to have l pl effsn 2021 Dr Sher increased his water pills by adding metolazone and his dyspnea improved \par he was referred to Pulm 2021                                   \par \par ____________\par PATIENT DATA\par ___________________\par \par AGE.      81 (2021)                \par SEX.            m    \par ALLERGY.  shrimp eyes swell up  cipro gout\par BP. 118/60 \par WEIGHT. 2021 165     \par BMI.       2021 25           \par SMOKING HISTORY. 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 2021 Dog\par FAM HO CANCER. Sister breast cancer \par FAMILY HO VTE.  No \par FAM HO ASTHMA. No\par BIRTHPLACE.     us \par \par ____________\par PATIENT DATA\par ___________________\par \par DYSPNEA SCORE. \par 2021 1 flight dyspnea  SOB if he walks fast \par FLU VACCINE. 10/1/2020  \par PNEUMONIA VACCINE.\par pcv 10/11/2017 ppsv 2018 \par COVID VACCINE. \par 2021 Moderna  Took both \par COVID STATUS. \par PEAK FLOW. \par PULSE OXIMETRY. 2021 ra rest 98\par HOME OXYGEN.  \par HOME NIV.  \par HOME NEBUL.    \par SPACER.   \par CONTACT.    \par INITIAL VISIT. 2021\par PCP. DR JUANA SHEFFIELD\par ___________\par PROBLEM LIST\par ______________\par   \par DYSPNEA ON EXERTION Initial visit 2021   \par PLEURAL EFFUSION 2021 CXR \par MEDIASTINAL MASS 2012 CT [facility] \par CAD\par SP CABG \par HFREF \par CHRONIC A fib \par HO LYMPHOMA CHEMO \par ANEMIA \par B12 DEFICIENCY \par ELEVATED ALKALINE PHOSPHATASE \par DM \par BPH \par \par ___________\par PROBLEM LIST\par ______________\par   \par DYSPNEA ON EXERTION Initial visit 2021   \par CBC 2021 W 5.9 Hb 10.4 Plt 189\par SMA7 2021 Na 138 K 4.8 CO2 24 Cr 1.3 \par CK 2021 CK 99 \par MEDIASTINAL MASS 2012 CT CH \par CT ch 2012 nslij Stable ant mediastinal mass 2009 \par PLEURAL EFFUSION 2021 CXR \par cxr  cxr zwanger mod to large l effsn \par \par CAD\par SP CABG \par HFREF \par echo 5/10/2017 A fib ef 35% large pericard effsbae \par CHRONIC A fib \par Is on coumadin\par HO LYMPHOMA CHEMO 2009\par 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 \par ___________\par 2021 \par EPLERONONE 12.5 \par ATORVASTAT 40 \par LASIX \par SYNTHROID \par WARFARIN \par \par __________________\par VISIT DATE AND FOLLOWUP RECOMMENDATIONS \par __________________\par \par 2021 RTC 2 w \par \par         \par __________________\par PLEURAL EFFUSION \par ASSESSMENT RECOMMENDATIONS\par __________________\par \par 2021\par Pl effusion noted on CXR 2021 \par Effusion Unilateral \par His cardio Dr Sher gave him extra lasix and his dyspnea improved \par He came to see Pulm initial visit was on 2021 \par His RA rest pulse ox is ok so he does not need home oxygen at this point\par he is on coumadin and is monitored b Dr Sher \par He has ho mediastinal mass and has ho lymphoma 2009 rxed with RCHOP \par VTE is unlikely as he is on coumadin \par Unilateral effusion if unexplained should be tapped and will see if it is still present on ct chest \par 2021 \par recommend CT chest no contarast (he has allergy to shrimp ) \par If sob increases call me and will admist to er \par         \par \par \par \par \par \par \par _______________\par PATIENT MINGO GAITAN\par \par PATIENT INSTRUCTIONS \par ______________\par \par 1) Please see me  IN 2 weeks\par 2) You should see me sooner if needed \par 3) RECOMMENDATIONS FOR THIS VISIT INCLUDE FOLLOWING  \par (Please check with  before you leave today and they will explain to you where and when to get tests done)\par \par A) IF BREATHING WORSE CALL DR ANDERSON  AND I WILL BRING YOU TO [facility] ER (Hosp phone )\par B) GET CT CHEST AT [facility]  ASAP \par \par \par ____________________________\par Copy of instructions was  given to patient at end of visit\par

## 2021-05-28 NOTE — REASON FOR VISIT
[Initial] : an initial visit [Spouse] : spouse [TextBox_44] : PLEURAL EFFUSION [TextBox_13] : DR JUANA SHEFFIELD

## 2021-07-12 ENCOUNTER — RX RENEWAL (OUTPATIENT)
Age: 81
End: 2021-07-12

## 2021-07-14 ENCOUNTER — RX RENEWAL (OUTPATIENT)
Age: 81
End: 2021-07-14

## 2021-07-14 RX ORDER — TAMSULOSIN HYDROCHLORIDE 0.4 MG/1
0.4 CAPSULE ORAL
Qty: 180 | Refills: 3 | Status: ACTIVE | COMMUNITY
Start: 2020-06-03 | End: 1900-01-01

## 2021-07-27 ENCOUNTER — APPOINTMENT (OUTPATIENT)
Dept: INTERNAL MEDICINE | Facility: CLINIC | Age: 81
End: 2021-07-27
Payer: MEDICARE

## 2021-07-27 VITALS
DIASTOLIC BLOOD PRESSURE: 73 MMHG | OXYGEN SATURATION: 96 % | HEART RATE: 89 BPM | SYSTOLIC BLOOD PRESSURE: 131 MMHG | TEMPERATURE: 97.4 F | BODY MASS INDEX: 25.11 KG/M2 | HEIGHT: 67 IN | WEIGHT: 160 LBS

## 2021-07-27 PROCEDURE — 99214 OFFICE O/P EST MOD 30 MIN: CPT

## 2021-07-27 NOTE — HISTORY OF PRESENT ILLNESS
[FreeTextEntry1] : SOB has improved , being treated by cardiologist fro heart failure. so he never went for CT chest pr f/u with pulmonary

## 2021-07-28 LAB
ALBUMIN SERPL ELPH-MCNC: 4.1 G/DL
ALP BLD-CCNC: 125 U/L
ALT SERPL-CCNC: 16 U/L
ANION GAP SERPL CALC-SCNC: 14 MMOL/L
AST SERPL-CCNC: 29 U/L
BILIRUB SERPL-MCNC: 0.5 MG/DL
BUN SERPL-MCNC: 46 MG/DL
CALCIUM SERPL-MCNC: 9.5 MG/DL
CHLORIDE SERPL-SCNC: 92 MMOL/L
CHOLEST SERPL-MCNC: 136 MG/DL
CK SERPL-CCNC: 122 U/L
CO2 SERPL-SCNC: 29 MMOL/L
CREAT SERPL-MCNC: 1.34 MG/DL
ESTIMATED AVERAGE GLUCOSE: 143 MG/DL
GLUCOSE SERPL-MCNC: 115 MG/DL
HBA1C MFR BLD HPLC: 6.6 %
HDLC SERPL-MCNC: 59 MG/DL
LDLC SERPL CALC-MCNC: 68 MG/DL
NONHDLC SERPL-MCNC: 78 MG/DL
POTASSIUM SERPL-SCNC: 4.2 MMOL/L
PROT SERPL-MCNC: 7.3 G/DL
SODIUM SERPL-SCNC: 136 MMOL/L
T4 FREE SERPL-MCNC: 1.3 NG/DL
T4 SERPL-MCNC: 6.5 UG/DL
TRIGL SERPL-MCNC: 50 MG/DL
TSH SERPL-ACNC: 2.34 UIU/ML

## 2021-10-06 PROBLEM — R77.9 ELEVATED BLOOD PROTEIN: Status: ACTIVE | Noted: 2020-02-13

## 2021-11-01 ENCOUNTER — APPOINTMENT (OUTPATIENT)
Dept: ENDOCRINOLOGY | Facility: CLINIC | Age: 81
End: 2021-11-01

## 2021-11-02 ENCOUNTER — APPOINTMENT (OUTPATIENT)
Dept: INTERNAL MEDICINE | Facility: CLINIC | Age: 81
End: 2021-11-02
Payer: MEDICARE

## 2021-11-02 ENCOUNTER — LABORATORY RESULT (OUTPATIENT)
Age: 81
End: 2021-11-02

## 2021-11-02 VITALS
HEIGHT: 67 IN | WEIGHT: 154 LBS | BODY MASS INDEX: 24.17 KG/M2 | SYSTOLIC BLOOD PRESSURE: 123 MMHG | DIASTOLIC BLOOD PRESSURE: 70 MMHG | HEART RATE: 78 BPM

## 2021-11-02 PROCEDURE — 36415 COLL VENOUS BLD VENIPUNCTURE: CPT

## 2021-11-02 PROCEDURE — 99214 OFFICE O/P EST MOD 30 MIN: CPT | Mod: 25

## 2021-11-02 RX ORDER — FUROSEMIDE 40 MG/1
40 TABLET ORAL
Qty: 90 | Refills: 0 | Status: ACTIVE | COMMUNITY
Start: 2021-09-15

## 2021-11-02 RX ORDER — FUROSEMIDE 20 MG/1
20 TABLET ORAL TWICE DAILY
Qty: 60 | Refills: 11 | Status: DISCONTINUED | COMMUNITY
End: 2021-11-02

## 2021-11-02 NOTE — HISTORY OF PRESENT ILLNESS
[FreeTextEntry1] : saw Dr. Barajas increase his furosemide to 40mg in the morning- his breathing is much improved\par He wants to go back to Generic synthroid.\par  Breathing is improved\par  warfarin being monitored by cardiologist\par  he is not using Tradjenta but he is monitoring his sugars which are good

## 2021-11-24 LAB
ALBUMIN SERPL ELPH-MCNC: 3.9 G/DL
ALP BLD-CCNC: 121 U/L
ALT SERPL-CCNC: 17 U/L
ANION GAP SERPL CALC-SCNC: 15 MMOL/L
AST SERPL-CCNC: 24 U/L
BASOPHILS # BLD AUTO: 0.04 K/UL
BASOPHILS NFR BLD AUTO: 0.7 %
BILIRUB SERPL-MCNC: 0.5 MG/DL
BUN SERPL-MCNC: 32 MG/DL
CALCIUM SERPL-MCNC: 9.3 MG/DL
CHLORIDE SERPL-SCNC: 99 MMOL/L
CHOLEST SERPL-MCNC: 143 MG/DL
CK SERPL-CCNC: 87 U/L
CO2 SERPL-SCNC: 23 MMOL/L
CREAT SERPL-MCNC: 1.13 MG/DL
EOSINOPHIL # BLD AUTO: 0.09 K/UL
EOSINOPHIL NFR BLD AUTO: 1.5 %
ESTIMATED AVERAGE GLUCOSE: 146 MG/DL
FERRITIN SERPL-MCNC: 56 NG/ML
GLUCOSE SERPL-MCNC: 103 MG/DL
HBA1C MFR BLD HPLC: 6.7 %
HCT VFR BLD CALC: 32.6 %
HDLC SERPL-MCNC: 54 MG/DL
HGB BLD-MCNC: 9.8 G/DL
IMM GRANULOCYTES NFR BLD AUTO: 0.5 %
IRON SATN MFR SERPL: 7 %
IRON SERPL-MCNC: 28 UG/DL
LDLC SERPL CALC-MCNC: 74 MG/DL
LYMPHOCYTES # BLD AUTO: 0.41 K/UL
LYMPHOCYTES NFR BLD AUTO: 6.9 %
MAN DIFF?: NORMAL
MCHC RBC-ENTMCNC: 26.8 PG
MCHC RBC-ENTMCNC: 30.1 GM/DL
MCV RBC AUTO: 89.1 FL
MONOCYTES # BLD AUTO: 0.83 K/UL
MONOCYTES NFR BLD AUTO: 14 %
NEUTROPHILS # BLD AUTO: 4.53 K/UL
NEUTROPHILS NFR BLD AUTO: 76.4 %
NONHDLC SERPL-MCNC: 89 MG/DL
PLATELET # BLD AUTO: 207 K/UL
POTASSIUM SERPL-SCNC: 4.8 MMOL/L
PROT SERPL-MCNC: 7.3 G/DL
RBC # BLD: 3.66 M/UL
RBC # FLD: 21.8 %
SODIUM SERPL-SCNC: 138 MMOL/L
T4 FREE SERPL-MCNC: 1.3 NG/DL
T4 SERPL-MCNC: 5.9 UG/DL
TIBC SERPL-MCNC: 380 UG/DL
TRIGL SERPL-MCNC: 72 MG/DL
TSH SERPL-ACNC: 3.46 UIU/ML
UIBC SERPL-MCNC: 352 UG/DL
WBC # FLD AUTO: 5.93 K/UL

## 2021-11-24 RX ORDER — FERROUS GLUCONATE 324(37.5)
324 (37.5 FE) TABLET ORAL
Qty: 60 | Refills: 6 | Status: ACTIVE | COMMUNITY
Start: 2021-11-24

## 2022-01-01 ENCOUNTER — APPOINTMENT (OUTPATIENT)
Dept: INTERNAL MEDICINE | Facility: CLINIC | Age: 82
End: 2022-01-01

## 2022-01-01 ENCOUNTER — NON-APPOINTMENT (OUTPATIENT)
Age: 82
End: 2022-01-01

## 2022-01-01 ENCOUNTER — APPOINTMENT (OUTPATIENT)
Dept: HEMATOLOGY ONCOLOGY | Facility: CLINIC | Age: 82
End: 2022-01-01

## 2022-01-01 ENCOUNTER — OUTPATIENT (OUTPATIENT)
Dept: OUTPATIENT SERVICES | Facility: HOSPITAL | Age: 82
LOS: 1 days | Discharge: HOME | End: 2022-01-01

## 2022-01-01 ENCOUNTER — APPOINTMENT (OUTPATIENT)
Dept: INTERNAL MEDICINE | Facility: CLINIC | Age: 82
End: 2022-01-01
Payer: MEDICARE

## 2022-01-01 ENCOUNTER — RX RENEWAL (OUTPATIENT)
Age: 82
End: 2022-01-01

## 2022-01-01 ENCOUNTER — LABORATORY RESULT (OUTPATIENT)
Age: 82
End: 2022-01-01

## 2022-01-01 VITALS
BODY MASS INDEX: 24.01 KG/M2 | OXYGEN SATURATION: 96 % | SYSTOLIC BLOOD PRESSURE: 138 MMHG | HEART RATE: 97 BPM | RESPIRATION RATE: 14 BRPM | WEIGHT: 153 LBS | DIASTOLIC BLOOD PRESSURE: 72 MMHG | HEIGHT: 67 IN

## 2022-01-01 VITALS
BODY MASS INDEX: 23.34 KG/M2 | DIASTOLIC BLOOD PRESSURE: 48 MMHG | SYSTOLIC BLOOD PRESSURE: 98 MMHG | HEART RATE: 62 BPM | OXYGEN SATURATION: 96 % | WEIGHT: 149 LBS | TEMPERATURE: 97.4 F

## 2022-01-01 VITALS — DIASTOLIC BLOOD PRESSURE: 50 MMHG | SYSTOLIC BLOOD PRESSURE: 80 MMHG

## 2022-01-01 DIAGNOSIS — R74.8 ABNORMAL LEVELS OF OTHER SERUM ENZYMES: ICD-10-CM

## 2022-01-01 DIAGNOSIS — Z09 ENCOUNTER FOR FOLLOW-UP EXAMINATION AFTER COMPLETED TREATMENT FOR CONDITIONS OTHER THAN MALIGNANT NEOPLASM: ICD-10-CM

## 2022-01-01 DIAGNOSIS — I10 ESSENTIAL (PRIMARY) HYPERTENSION: ICD-10-CM

## 2022-01-01 DIAGNOSIS — R06.02 SHORTNESS OF BREATH: ICD-10-CM

## 2022-01-01 DIAGNOSIS — J90 PLEURAL EFFUSION, NOT ELSEWHERE CLASSIFIED: ICD-10-CM

## 2022-01-01 DIAGNOSIS — D64.9 ANEMIA, UNSPECIFIED: ICD-10-CM

## 2022-01-01 DIAGNOSIS — I48.91 UNSPECIFIED ATRIAL FIBRILLATION: Chronic | ICD-10-CM

## 2022-01-01 DIAGNOSIS — D75.839 THROMBOCYTOSIS, UNSPECIFIED: ICD-10-CM

## 2022-01-01 DIAGNOSIS — Z98.890 OTHER SPECIFIED POSTPROCEDURAL STATES: Chronic | ICD-10-CM

## 2022-01-01 LAB
ALBUMIN SERPL ELPH-MCNC: 3.8 G/DL
ALBUMIN SERPL ELPH-MCNC: 4.2 G/DL
ALP BLD-CCNC: 135 U/L
ALP BLD-CCNC: 97 U/L
ALT SERPL-CCNC: 16 U/L
ALT SERPL-CCNC: 18 U/L
ANION GAP SERPL CALC-SCNC: 14 MMOL/L
AST SERPL-CCNC: 21 U/L
AST SERPL-CCNC: 33 U/L
BASOPHILS # BLD AUTO: 0 K/UL
BASOPHILS NFR BLD AUTO: 0 %
BILIRUB DIRECT SERPL-MCNC: 0.1 MG/DL
BILIRUB INDIRECT SERPL-MCNC: 0.1 MG/DL
BILIRUB SERPL-MCNC: 0.3 MG/DL
BILIRUB SERPL-MCNC: 0.5 MG/DL
BUN SERPL-MCNC: 28 MG/DL
CALCIUM SERPL-MCNC: 9.3 MG/DL
CHLORIDE SERPL-SCNC: 101 MMOL/L
CHOLEST SERPL-MCNC: 135 MG/DL
CK SERPL-CCNC: 191 U/L
CO2 SERPL-SCNC: 24 MMOL/L
CREAT SERPL-MCNC: 1.12 MG/DL
EGFR: 65 ML/MIN/1.73M2
EOSINOPHIL # BLD AUTO: 0.19 K/UL
EOSINOPHIL NFR BLD AUTO: 3.5 %
ESTIMATED AVERAGE GLUCOSE: 131 MG/DL
FERRITIN SERPL-MCNC: 71 NG/ML
FOLATE SERPL-MCNC: >20 NG/ML
GGT SERPL-CCNC: 46 U/L
GLUCOSE SERPL-MCNC: 123 MG/DL
HBA1C MFR BLD HPLC: 6.2 %
HCT VFR BLD CALC: 25.3 %
HDLC SERPL-MCNC: 48 MG/DL
HGB BLD-MCNC: 8.1 G/DL
INR PPP: 3.02 RATIO
IRON SATN MFR SERPL: 8 %
IRON SERPL-MCNC: 26 UG/DL
LDLC SERPL CALC-MCNC: 69 MG/DL
LYMPHOCYTES # BLD AUTO: 0.38 K/UL
LYMPHOCYTES NFR BLD AUTO: 7 %
MAN DIFF?: NORMAL
MCHC RBC-ENTMCNC: 32 GM/DL
MCHC RBC-ENTMCNC: 33.1 PG
MCV RBC AUTO: 103.3 FL
MONOCYTES # BLD AUTO: 0.57 K/UL
MONOCYTES NFR BLD AUTO: 10.5 %
NEUTROPHILS # BLD AUTO: 4.26 K/UL
NEUTROPHILS NFR BLD AUTO: 79 %
NONHDLC SERPL-MCNC: 86 MG/DL
NT-PROBNP SERPL-MCNC: 2743 PG/ML
PLATELET # BLD AUTO: 185 K/UL
POTASSIUM SERPL-SCNC: 4.6 MMOL/L
PROT SERPL-MCNC: 6.8 G/DL
PROT SERPL-MCNC: 7.1 G/DL
PT BLD: 35.4 SEC
RBC # BLD: 2.45 M/UL
RBC # FLD: 16.4 %
SODIUM SERPL-SCNC: 139 MMOL/L
T4 FREE SERPL-MCNC: 1.4 NG/DL
T4 FREE SERPL-MCNC: 1.5 NG/DL
T4 SERPL-MCNC: 6.3 UG/DL
T4 SERPL-MCNC: 7.4 UG/DL
TIBC SERPL-MCNC: 334 UG/DL
TRIGL SERPL-MCNC: 86 MG/DL
TSH SERPL-ACNC: 4.21 UIU/ML
TSH SERPL-ACNC: 7.59 UIU/ML
UIBC SERPL-MCNC: 308 UG/DL
VIT B12 SERPL-MCNC: >2000 PG/ML
WBC # FLD AUTO: 5.39 K/UL

## 2022-01-01 PROCEDURE — 36415 COLL VENOUS BLD VENIPUNCTURE: CPT

## 2022-01-01 PROCEDURE — 99214 OFFICE O/P EST MOD 30 MIN: CPT | Mod: 25

## 2022-01-01 PROCEDURE — 99496 TRANSJ CARE MGMT HIGH F2F 7D: CPT | Mod: 25

## 2022-01-01 RX ORDER — AMIODARONE HYDROCHLORIDE 200 MG/1
200 TABLET ORAL
Qty: 90 | Refills: 0 | Status: ACTIVE | COMMUNITY
Start: 2022-01-01

## 2022-01-01 RX ORDER — LEVOTHYROXINE SODIUM 0.07 MG/1
75 TABLET ORAL DAILY
Qty: 90 | Refills: 0 | Status: ACTIVE | COMMUNITY
Start: 2019-06-07 | End: 1900-01-01

## 2022-01-01 RX ORDER — WARFARIN 2 MG/1
2 TABLET ORAL
Qty: 7 | Refills: 0 | Status: DISCONTINUED | COMMUNITY
Start: 2022-01-01

## 2022-03-12 ENCOUNTER — LABORATORY RESULT (OUTPATIENT)
Age: 82
End: 2022-03-12

## 2022-03-16 LAB
ALBUMIN SERPL ELPH-MCNC: 4.1 G/DL
ALP BLD-CCNC: 143 U/L
ALT SERPL-CCNC: 22 U/L
ANION GAP SERPL CALC-SCNC: 15 MMOL/L
AST SERPL-CCNC: 27 U/L
BASOPHILS # BLD AUTO: 0.03 K/UL
BASOPHILS NFR BLD AUTO: 0.5 %
BILIRUB SERPL-MCNC: 0.7 MG/DL
BUN SERPL-MCNC: 29 MG/DL
CALCIUM SERPL-MCNC: 9.5 MG/DL
CHLORIDE SERPL-SCNC: 101 MMOL/L
CO2 SERPL-SCNC: 25 MMOL/L
CREAT SERPL-MCNC: 1.24 MG/DL
EGFR: 58 ML/MIN/1.73M2
EOSINOPHIL # BLD AUTO: 0.2 K/UL
EOSINOPHIL NFR BLD AUTO: 3.5 %
ESTIMATED AVERAGE GLUCOSE: 137 MG/DL
FERRITIN SERPL-MCNC: 107 NG/ML
GLUCOSE SERPL-MCNC: 120 MG/DL
HBA1C MFR BLD HPLC: 6.4 %
HCT VFR BLD CALC: 36.5 %
HGB BLD-MCNC: 11.6 G/DL
IMM GRANULOCYTES NFR BLD AUTO: 0.2 %
IRON SATN MFR SERPL: 12 %
IRON SERPL-MCNC: 40 UG/DL
LYMPHOCYTES # BLD AUTO: 0.49 K/UL
LYMPHOCYTES NFR BLD AUTO: 8.5 %
MAN DIFF?: NORMAL
MCHC RBC-ENTMCNC: 31.7 PG
MCHC RBC-ENTMCNC: 31.8 GM/DL
MCV RBC AUTO: 99.7 FL
MONOCYTES # BLD AUTO: 0.83 K/UL
MONOCYTES NFR BLD AUTO: 14.4 %
NEUTROPHILS # BLD AUTO: 4.21 K/UL
NEUTROPHILS NFR BLD AUTO: 72.9 %
PLATELET # BLD AUTO: 179 K/UL
POTASSIUM SERPL-SCNC: 4.8 MMOL/L
PROT SERPL-MCNC: 7.5 G/DL
RBC # BLD: 3.66 M/UL
RBC # FLD: 16.1 %
SODIUM SERPL-SCNC: 142 MMOL/L
TIBC SERPL-MCNC: 337 UG/DL
UIBC SERPL-MCNC: 297 UG/DL
WBC # FLD AUTO: 5.77 K/UL

## 2022-05-26 NOTE — HISTORY OF PRESENT ILLNESS
[Post-hospitalization from ___ Hospital] : Post-hospitalization from [unfilled] Hospital [Admitted on: ___] : The patient was admitted on [unfilled] [Discharged on ___] : discharged on [unfilled] [FreeTextEntry2] : Saw cardiologist Dr. Sher who recommended defibrilator/pacemaker. he is on 2.5mg warfarin a day. patient has h/o left sided pleural effusion- followed by Dr. Barajas\par  EF was 25%-30%

## 2022-05-26 NOTE — PHYSICAL EXAM
[No Acute Distress] : no acute distress [Well Nourished] : well nourished [Well Developed] : well developed [Well-Appearing] : well-appearing [Normal Sclera/Conjunctiva] : normal sclera/conjunctiva [PERRL] : pupils equal round and reactive to light [EOMI] : extraocular movements intact [Normal Outer Ear/Nose] : the outer ears and nose were normal in appearance [Normal Oropharynx] : the oropharynx was normal [No JVD] : no jugular venous distention [No Lymphadenopathy] : no lymphadenopathy [Supple] : supple [Thyroid Normal, No Nodules] : the thyroid was normal and there were no nodules present [No Respiratory Distress] : no respiratory distress  [No Accessory Muscle Use] : no accessory muscle use [Normal Rate] : normal rate  [Regular Rhythm] : with a regular rhythm [Normal S1, S2] : normal S1 and S2 [No Murmur] : no murmur heard [No Carotid Bruits] : no carotid bruits [No Abdominal Bruit] : a ~M bruit was not heard ~T in the abdomen [No Varicosities] : no varicosities [Pedal Pulses Present] : the pedal pulses are present [No Edema] : there was no peripheral edema [No Palpable Aorta] : no palpable aorta [No Extremity Clubbing/Cyanosis] : no extremity clubbing/cyanosis [Soft] : abdomen soft [Non Tender] : non-tender [Non-distended] : non-distended [No Masses] : no abdominal mass palpated [No HSM] : no HSM [Normal Bowel Sounds] : normal bowel sounds [Normal Posterior Cervical Nodes] : no posterior cervical lymphadenopathy [Normal Anterior Cervical Nodes] : no anterior cervical lymphadenopathy [No CVA Tenderness] : no CVA  tenderness [No Spinal Tenderness] : no spinal tenderness [No Joint Swelling] : no joint swelling [Grossly Normal Strength/Tone] : grossly normal strength/tone [No Rash] : no rash [Coordination Grossly Intact] : coordination grossly intact [No Focal Deficits] : no focal deficits [Normal Gait] : normal gait [Deep Tendon Reflexes (DTR)] : deep tendon reflexes were 2+ and symmetric [Normal Affect] : the affect was normal [Normal Insight/Judgement] : insight and judgment were intact [de-identified] : left sided decrese breath sound 1/2 way up

## 2022-08-09 PROBLEM — Z09 HOSPITAL DISCHARGE FOLLOW-UP: Status: RESOLVED | Noted: 2022-01-01 | Resolved: 2022-01-01

## 2022-08-09 PROBLEM — R06.02 SOB (SHORTNESS OF BREATH) ON EXERTION: Status: ACTIVE | Noted: 2021-05-05

## 2022-08-09 PROBLEM — D75.839 THROMBOCYTHEMIA: Status: ACTIVE | Noted: 2022-01-01

## 2022-08-09 PROBLEM — R74.8 ABNORMAL LIVER ENZYMES: Status: ACTIVE | Noted: 2022-01-01

## 2022-08-09 PROBLEM — J90 PLEURAL EFFUSION: Status: ACTIVE | Noted: 2021-05-28

## 2022-08-09 NOTE — PHYSICAL EXAM
[No Acute Distress] : no acute distress [Well Nourished] : well nourished [Well Developed] : well developed [Well-Appearing] : well-appearing [Normal Sclera/Conjunctiva] : normal sclera/conjunctiva [PERRL] : pupils equal round and reactive to light [EOMI] : extraocular movements intact [Normal Outer Ear/Nose] : the outer ears and nose were normal in appearance [Normal Oropharynx] : the oropharynx was normal [No JVD] : no jugular venous distention [No Lymphadenopathy] : no lymphadenopathy [Supple] : supple [Thyroid Normal, No Nodules] : the thyroid was normal and there were no nodules present [No Respiratory Distress] : no respiratory distress  [No Accessory Muscle Use] : no accessory muscle use [Normal Rate] : normal rate  [Regular Rhythm] : with a regular rhythm [Normal S1, S2] : normal S1 and S2 [No Murmur] : no murmur heard [No Carotid Bruits] : no carotid bruits [No Abdominal Bruit] : a ~M bruit was not heard ~T in the abdomen [No Varicosities] : no varicosities [Pedal Pulses Present] : the pedal pulses are present [No Edema] : there was no peripheral edema [No Palpable Aorta] : no palpable aorta [No Extremity Clubbing/Cyanosis] : no extremity clubbing/cyanosis [Soft] : abdomen soft [Non Tender] : non-tender [Non-distended] : non-distended [No Masses] : no abdominal mass palpated [No HSM] : no HSM [Normal Bowel Sounds] : normal bowel sounds [Normal Posterior Cervical Nodes] : no posterior cervical lymphadenopathy [Normal Anterior Cervical Nodes] : no anterior cervical lymphadenopathy [No CVA Tenderness] : no CVA  tenderness [No Spinal Tenderness] : no spinal tenderness [No Joint Swelling] : no joint swelling [Grossly Normal Strength/Tone] : grossly normal strength/tone [No Rash] : no rash [Coordination Grossly Intact] : coordination grossly intact [No Focal Deficits] : no focal deficits [Normal Gait] : normal gait [Deep Tendon Reflexes (DTR)] : deep tendon reflexes were 2+ and symmetric [Normal Affect] : the affect was normal [Normal Insight/Judgement] : insight and judgment were intact [de-identified] : left sided decrese breath sound 1/2 way up

## 2022-08-09 NOTE — HISTORY OF PRESENT ILLNESS
[FreeTextEntry1] : C/o feeling dizzy and lightheaded. His BP is very low. He saw cardiologist yesterday and he decreased Entresto to once daily . He is s/p cardioversion 6/29 and 7/19 Afib came back. \par  dr. wakefield, Dr. Borrero electrophysiologist

## 2022-10-11 NOTE — H&P PST ADULT - RESPIRATORY RATE (BREATHS/MIN)
SAMANTA PARKER    [id number]    POST OPERATIVE DAY #:  5  STATUS POST: T10-pelvis fusion, L3-S1 lami                       SUBJECTIVE: Patient seen and examined at bedside. Patient reporting having difficulty working with physical therapy. Patient reports he is much more comfortable today than previous with adjustments to his pain regimen. Patient reports OOB to chair yesterday. Patient wishes to ambulate more today. Patient denies any acute motor sensory changes.     Pain (0-10):     OBJECTIVE:     Vital Signs Last 24 Hrs  T(C): 36.6 (11 Oct 2022 09:36), Max: 36.9 (10 Oct 2022 17:09)  T(F): 97.8 (11 Oct 2022 09:36), Max: 98.5 (10 Oct 2022 17:09)  HR: 91 (11 Oct 2022 09:36) (78 - 91)  BP: 156/92 (11 Oct 2022 09:36) (129/77 - 163/86)  BP(mean): --  RR: 17 (11 Oct 2022 09:36) (17 - 18)  SpO2: 95% (11 Oct 2022 09:36) (95% - 96%)    Parameters below as of 11 Oct 2022 09:36  Patient On (Oxygen Delivery Method): room air      I&O's Summary    10 Oct 2022 07:01  -  11 Oct 2022 07:00  --------------------------------------------------------  IN: 0 mL / OUT: 1915 mL / NET: -1915 mL    Constitutional:Alert, responsive, in no acute distress.   Abdominal: soft and supple non distended  Lympatics: no pretibial pitting edema    Spine:          Dressing: c/d/i         Drains: HV present x 1         Sensation: SILT           Motor exam:         [ ] Lower extremeity                  HF(l2)    KE(l3)    TA(l4)   EHL(l5)     GS(s1)                                                 R        5/5        5/5        5/5      5/5          5/5                                               L         5/5        5/5       5/5       5/5          5/5                                                 Vascular: warm well perfused; capillary refill <3 seconds                                                      LABS:    RADIOLOGY & ADDITIONAL STUDIES:    A/P :  67y Male S/P T10-pelvis fusion, L3-S1 lami     POD#5  -    Pain control  -    DVT ppx: eliquis  -    Periop abx: ancef until drains out  -    ADAT  -    Check AM labs    -    Monitor Drain Output     -    Resume home meds  -    Physical Therapy  -    Weight bearing status: WBAT   -    Dispo: rehab 15

## 2022-11-29 NOTE — PROVIDER CONTACT NOTE (OTHER) - DATE AND TIME:
[facility]                  PICU ACCEPT NOTE    ADMISSION DATE:  11/27/2022  DATE:  11/29/2022  CURRENT HOSPITAL DAY:  Hospital Day: 3       CHIEF COMPLAINT:  Sickle cell acute chest, pain crisis, +Influenza A    INTERVAL HISTORY:    - yesterday - tolerated transition to HFNC, no acute events overnight  - afebrile, remains on Ceftriaxone, Azithro, and Vancomycin  - pain controlled with current pain management, Dilaudid 7 attempts overnight     OBJECTIVE:    VITAL SIGNS:     Vital Last Value 24 Hour Range   Temperature 98.6 °F (37 °C) (11/29/22 1000) Temp  Min: 97.8 °F (36.6 °C)  Max: 103 °F (39.4 °C)   Pulse (!) 146 (11/29/22 1100) Pulse  Min: 70  Max: 146   Respiratory (!) 28 (11/29/22 1100) Resp  Min: 16  Max: 33   Non-Invasive  Blood Pressure 130/71 (11/29/22 1000) BP  Min: 122/69  Max: 147/63   Pulse Oximetry 99 % (11/29/22 1100) SpO2  Min: 91 %  Max: 100 %     Vital Today Admitted   Weight 77.9 kg (171 lb 11.8 oz) (11/29/22 0400) Weight: 74.4 kg (164 lb 0.4 oz) (11/27/22 0207)   Height N/A     Body Mass Index N/A       INTAKE/OUTPUT:    Date 11/28/22 0700 - 11/29/22 0659 11/29/22 0700 - 11/30/22 0659   Shift 8173-2820 5473-4040 3017-7221 24 Hour Total 1903-7528 6363-9510 8250-0035 24 Hour Total   INTAKE   P.O.(mL/kg) 1460(19.89) 990(13.49)  2450(31.45)       I.V.(mL/kg) 425.54(5.8) 250.79(3.42) 246.2(3.16) 922.53(11.84) 165.8(2.13)   165.8(2.13)   IV Piggyback(mL/kg) 120.83(1.65) 343.75(4.68) 260.42(3.34) 725(9.31)       Shift Total(mL/kg) 2006.37(27.34) 1584.54(21.59) 506.62(6.5) 4097.53(52.6) 165.8(2.13)   165.8(2.13)   OUTPUT   Urine(mL/kg/hr)  1225(2.09) 950(1.52) 2175(1.16) 310   310   Shift Total(mL/kg)  1225(16.69) 950(12.2) 2175(27.92) 310(3.98)   310(3.98)   Weight (kg) 73.4 73.4 77.9 77.9 77.9 77.9 77.9 77.9         Intake/Output Summary (Last 24 hours) at 11/29/2022 1200  Last data filed at 11/29/2022 1159  Gross per 24 hour   Intake 3015.59 ml   Output 2485 ml   Net  530.59 ml       PHYSICAL EXAM:    Physical Exam  Vitals and nursing note reviewed.   Constitutional:       General: He is awake. He is not in acute distress.     Appearance: He is not toxic-appearing.   HENT:      Head: Normocephalic.      Nose: Nose normal.      Mouth/Throat:      Mouth: Mucous membranes are moist.   Eyes:      Conjunctiva/sclera: Conjunctivae normal.      Pupils: Pupils are equal, round, and reactive to light.   Cardiovascular:      Rate and Rhythm: Regular rhythm. Tachycardia present.      Pulses: Normal pulses.      Heart sounds: Normal heart sounds.   Pulmonary:      Comments: Off HFNC, no retractions or increased work of breathing, diminished in bases bilaterally, otherwise clear throughout  Abdominal:      General: Abdomen is flat. Bowel sounds are normal.   Musculoskeletal:         General: Normal range of motion.      Cervical back: Normal range of motion.   Skin:     General: Skin is warm.      Capillary Refill: Capillary refill takes less than 2 seconds.   Neurological:      General: No focal deficit present.      Mental Status: He is alert and oriented to person, place, and time. Mental status is at baseline.         RESPIRATORY SUPPORT:  Oxygen Therapy  O2 Device: High flow nasal cannula  O2 Flow Rate (L/min): 30 L/min  FiO2 (%): 40 %      LABORATORY DATA:    Recent Results (from the past 24 hour(s))   Urinalysis With Microscopy Exam W/O C/S    Collection Time: 11/28/22  7:34 PM   Result Value Ref Range    COLOR, URINALYSIS Yellow     APPEARANCE, URINALYSIS Cloudy     GLUCOSE, URINALYSIS Negative Negative mg/dL    BILIRUBIN, URINALYSIS Negative Negative    KETONES, URINALYSIS 15   (A) Negative mg/dL    SPECIFIC GRAVITY, URINALYSIS 1.013 1.005 - 1.030    OCCULT BLOOD, URINALYSIS Negative Negative    PH, URINALYSIS 6.0 5.0 - 7.0    PROTEIN, URINALYSIS Trace (A) Negative mg/dL    UROBILINOGEN, URINALYSIS 2.0 (A) 0.2, 1.0 mg/dL    NITRITE, URINALYSIS Negative Negative    LEUKOCYTE ESTERASE,  14-Sep-2018 03:11 URINALYSIS Negative Negative    SQUAMOUS EPITHELIAL, URINALYSIS 1 to 5 None Seen, 1 to 5 /hpf    ERYTHROCYTES, URINALYSIS 1 to 2 None Seen, 1 to 2 /hpf    LEUKOCYTES, URINALYSIS 1 to 5 None Seen, 1 to 5 /hpf    BACTERIA, URINALYSIS Few (A) None Seen /hpf    HYALINE CASTS, URINALYSIS None Seen None Seen, 1 to 5 /lpf    MUCUS Present    Vancomycin, Trough    Collection Time: 11/28/22  9:10 PM   Result Value Ref Range    Vancomycin, Trough 10.0 10.0 - 20.0 mcg/mL   Vancomycin, Peak    Collection Time: 11/29/22 12:39 AM   Result Value Ref Range    Vancomycin, Peak 34.1 20.0 - 40.0 mcg/mL   Reticulocyte Count Automated    Collection Time: 11/29/22  4:08 AM   Result Value Ref Range    Retic  (H) 10 - 120 K/mcL    Immature Retic Frac 15.7 1.5 - 16.0 %    Retic Count 12.3 (H) 0.3 - 2.5 %    Reticulocyte Hemoglobin Content 25.7 (L) 28.6 - 36.3 pg   Basic Metabolic Panel    Collection Time: 11/29/22  4:08 AM   Result Value Ref Range    Fasting Status      Sodium 137 135 - 145 mmol/L    Potassium 4.2 3.4 - 5.1 mmol/L    Chloride 107 97 - 110 mmol/L    Carbon Dioxide 25 21 - 32 mmol/L    Anion Gap 9 7 - 19 mmol/L    Glucose 109 (H) 70 - 99 mg/dL    BUN 8 6 - 20 mg/dL    Creatinine 0.59 0.38 - 1.15 mg/dL    Glomerular Filtration Rate      BUN/ Creatinine Ratio 14 7 - 25    Calcium 8.5 8.0 - 11.0 mg/dL   CBC with Automated Differential (performable only)    Collection Time: 11/29/22  4:08 AM   Result Value Ref Range    WBC 42.7 (H) 4.2 - 11.0 K/mcL    RBC 2.90 (L) 3.90 - 5.30 mil/mcL    HGB 8.5 (L) 13.0 - 17.0 g/dL    HCT 23.3 (L) 39.0 - 51.0 %    MCV 82.6 78.0 - 100.0 fl    MCH 30.1 26.0 - 34.0 pg    MCHC 36.5 32.0 - 36.5 g/dL    RDW-CV 18.8 (H) 11.0 - 15.0 %    RDW-SD 56.7 (H) 35.0 - 47.0 fL     140 - 450 K/mcL    NRBC 0 <=0 /100 WBC    Neutrophil, Percent 83 %    Lymphocytes, Percent 7 %    Mono, Percent 8 %    Eosinophils, Percent 0 %    Basophils, Percent 0 %    Immature Granulocytes 2 %    Absolute Neutrophils  35.4 (H) 1.8 - 8.0 K/mcL    Absolute Lymphocytes 2.8 1.5 - 6.5 K/mcL    Absolute Monocytes 3.5 (H) 0.0 - 0.8 K/mcL    Absolute Eosinophils  0.2 0.0 - 0.5 K/mcL    Absolute Basophils 0.1 0.0 - 0.2 K/mcL    Absolute Immmature Granulocytes 0.7 (H) 0.0 - 0.2 K/mcL   Bilirubin, Total    Collection Time: 11/29/22  4:08 AM   Result Value Ref Range    Bilirubin, Total 2.9 (H) 0.2 - 1.0 mg/dL   ]       IMAGING STUDIES:    XR CHEST PA AND LATERAL 2 VIEWS    Result Date: 11/27/2022  PROCEDURE: XR Chest 2 Views TECHNIQUE: 2 view chest HISTORY: chset pain, sob, sickle cell chset pain, sob, sickle cell COMPARISONS: 2/20/2020. FINDINGS: Artifacts overlie the neck and upper chest Heart and mediastinum: Prominent heart size consistent with sickle cell disease.. Lungs: Mild groundglass opacification in the peripheral left upper lobe Pneumothorax: None evident Pleural effusion: None evident Bones: Unremarkable Upper abdomen: Unremarkable Other: None IMPRESSION: Mild left upper lobe opacification may indicate acute chest     This document is electronically signed by Holley Yen ([facility] pediatric radiologist), on 11/27/2022 03:52 AM CST.       Imaging studies reviewed.           MEDICATIONS:    Current Facility-Administered Medications   Medication Dose Route Frequency Provider Last Rate Last Admin   • naLOXone (NARCAN) 4 mg/250 mL in D5W infusion  0.3 mg/hr Intravenous Continuous Chary Ramirez DO 18.8 mL/hr at 11/28/22 2304 0.3 mg/hr at 11/28/22 2304   • sodium chloride 0.9% infusion   Intravenous Continuous Parris Kelley MD 10 mL/hr at 11/27/22 1624 New Bag at 11/27/22 1624   • sodium chloride 0.9% infusion   Intravenous Continuous PRN Parris Kelley MD       • sodium chloride 0.9% infusion   Intravenous Continuous PRN Parris Kelley MD       • HYDROmorphone pediatric (DILAUDID) 0.2 mg/mL in 30 mL PCA infusion   Intravenous Continuous Nestor Guzmán MD   New Bag at 11/29/22 0249   • dextrose 5 % / sodium  chloride 0.9% with KCl 20 mEq infusion   Intravenous Continuous Jose M Aburto MD 10 mL/hr at 11/28/22 1007 Rate Change at 11/28/22 1007     Current Facility-Administered Medications   Medication Dose Route Frequency Provider Last Rate Last Admin   • vancomycin (VANCOCIN) 1,000 mg in sodium chloride 0.9 % 250 mL IVPB  1,000 mg Intravenous 3 times per day Nestor Guzmán MD       • VANCOMYCIN - PHARMACIST MONITORED Misc   Does not apply See Admin Instructions Kaela Tello CNP       • acetaminophen (TYLENOL) tablet 650 mg  650 mg Oral 4 times per day Kaela Tello CNP   650 mg at 11/29/22 0637   • azithromycin (ZITHROMAX) tablet 500 mg  500 mg Oral Q24H Kaela Tello CNP   500 mg at 11/28/22 2015   • ketorolac (TORADOL) injection 15 mg  15 mg Intravenous Q6H Nestor Guzmán MD   15 mg at 11/29/22 0826   • cefTRIAXone (ROCEPHIN) syringe 2,000 mg  2,000 mg Intravenous Daily Nestor Guzmán MD   2,000 mg at 11/29/22 0428   • folic acid (FOLATE) tablet 1 mg  1 mg Oral Daily Nestor Guzmán MD   1 mg at 11/29/22 0824   • hydroxyUREA (HYDREA) capsule 2,500 mg  2,500 mg Oral Once per day on Sun Fri Sat Nestor Guzmán MD   2,500 mg at 11/27/22 2153   • hydroxyUREA (HYDREA) capsule 2,000 mg  2,000 mg Oral Once per day on Mon Tue Wed Thu Nestor Guzmán MD   2,000 mg at 11/28/22 2013   • polyethylene glycol (MIRALAX) packet 17 g  17 g Oral Daily Nestor Guzmán MD   17 g at 11/29/22 0824   • senna (SENOKOT) 17.2 mg  2 tablet Oral QHS Nestor Guzámn MD   17.2 mg at 11/28/22 2015   • oseltamivir (TAMIFLU) capsule 75 mg  75 mg Oral 2 times per day Parris Kelley MD   75 mg at 11/29/22 0824   • sodium chloride (PF) 0.9 % injection 0.5-1 mL  0.5-1 mL Intravenous Q6H Jose M Aburto MD   1 mL at 11/29/22 1031       ACTIVE PROBLEMS:    Active Hospital Problems    Diagnosis    • Sickle cell anemia with crisis (CMS/HCC)    • 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 Requiring PICU admission for risk of cardiorespiratory decompensation.    PLAN:    Neuro:   1. Pain control  - Dilaudid PCA dose 0.2mg, lock out 8 min, continuous 0.2, 1 hour limit 1 mg  - Narcan 0.25mg/hr for pruitus/constipation  - toradol 15mg q6h ATC  - Tylenol q6 ATC alternating with toradol   - CAP-D Q12hrs    Respiratory:   1. Resp failure   - 30L HFNC 50%, wean as tolerated   - encourage incentive spirometry/Aerobika while awake    2. Concern of left sided effusion/ pneumonia/ACS  - chest US- trace left pleural effusion  - CXR improved 11/28, will only repeat if clinically indicated    Cardiovascular:   - no acute issues, hemodynamically stable     Heme:   1. Acute Chest syndrome  - Heme/Onc consult, appreciate recs   - s/p PRBC transfusion 11/27, lasix 20mg X1  - 11/29 Reticulocyte 12.3               - No exchange at this time given improved respiratory status  - CBC, retic in am                2. HgB SS  - Continue home folic acid 1mg daily  - Continue home hydroxyurea 2500 mg (5 capsules) by mouth daily- Friday-Sunday and take 2000 mg (4 capsules) by mouth daily Monday-Friday     FEN/GI:   1. Nutrition  - Pediatric diet  - Strict I/Os     2. Constipation  - miralax qAM, senna qPM    ID:   1. Flu A  - Tamiflu BID x5days (s11/27)  - droplet/contact isolation     2. LLL pneumonia, asplenia  - Ceftriaxone 2grams IV q24h (s11/27)  - Azithromycin PO 500mg daily   - MRSA swab positive   - Vancomycin 1gram q8hr, pharm titrating   - Blood culture (11/27) NGTD     VTE SCORE:  3 (PICU, Infection,  SS)  LINES: PIV The use, function and necessity of all lines and invasive devices was discussed on rounds  PICU UP: Level 3: Level 1 and 2 activities plus OOB to chair TID or sitting up in bed TID if appropriate chair is not available; ambulate BID if trunk control present    Disposition: PICU    Plan discussed with PICU team, mom, pt and consulting team. All in agreement with plan of care.        Sue Norris APRN  Pediatric Critical Care    11/29/22 12:00 PM

## 2023-01-01 ENCOUNTER — RX RENEWAL (OUTPATIENT)
Age: 83
End: 2023-01-01

## 2023-01-01 ENCOUNTER — APPOINTMENT (OUTPATIENT)
Dept: FAMILY MEDICINE | Facility: CLINIC | Age: 83
End: 2023-01-01
Payer: MEDICARE

## 2023-01-01 ENCOUNTER — LABORATORY RESULT (OUTPATIENT)
Age: 83
End: 2023-01-01

## 2023-01-01 ENCOUNTER — TRANSCRIPTION ENCOUNTER (OUTPATIENT)
Age: 83
End: 2023-01-01

## 2023-01-01 ENCOUNTER — NON-APPOINTMENT (OUTPATIENT)
Age: 83
End: 2023-01-01

## 2023-01-01 VITALS
SYSTOLIC BLOOD PRESSURE: 118 MMHG | TEMPERATURE: 97.7 F | HEIGHT: 66 IN | DIASTOLIC BLOOD PRESSURE: 64 MMHG | WEIGHT: 152 LBS | HEART RATE: 51 BPM | OXYGEN SATURATION: 94 % | BODY MASS INDEX: 24.43 KG/M2

## 2023-01-01 VITALS
BODY MASS INDEX: 22.82 KG/M2 | DIASTOLIC BLOOD PRESSURE: 60 MMHG | HEIGHT: 66 IN | SYSTOLIC BLOOD PRESSURE: 110 MMHG | WEIGHT: 142 LBS | HEART RATE: 68 BPM | OXYGEN SATURATION: 95 %

## 2023-01-01 DIAGNOSIS — I48.21 PERMANENT ATRIAL FIBRILLATION: ICD-10-CM

## 2023-01-01 DIAGNOSIS — E11.9 TYPE 2 DIABETES MELLITUS W/OUT COMPLICATIONS: ICD-10-CM

## 2023-01-01 DIAGNOSIS — E55.9 VITAMIN D DEFICIENCY, UNSPECIFIED: ICD-10-CM

## 2023-01-01 DIAGNOSIS — Z79.01 LONG TERM (CURRENT) USE OF ANTICOAGULANTS: ICD-10-CM

## 2023-01-01 DIAGNOSIS — Z01.818 ENCOUNTER FOR OTHER PREPROCEDURAL EXAMINATION: ICD-10-CM

## 2023-01-01 DIAGNOSIS — E78.00 PURE HYPERCHOLESTEROLEMIA, UNSPECIFIED: ICD-10-CM

## 2023-01-01 DIAGNOSIS — D64.9 ANEMIA, UNSPECIFIED: ICD-10-CM

## 2023-01-01 DIAGNOSIS — E03.9 HYPOTHYROIDISM, UNSPECIFIED: ICD-10-CM

## 2023-01-01 DIAGNOSIS — I25.10 ATHEROSCLEROTIC HEART DISEASE OF NATIVE CORONARY ARTERY W/OUT ANGINA PECTORIS: ICD-10-CM

## 2023-01-01 DIAGNOSIS — Z00.00 ENCOUNTER FOR GENERAL ADULT MEDICAL EXAMINATION W/OUT ABNORMAL FINDINGS: ICD-10-CM

## 2023-01-01 DIAGNOSIS — I50.22 CHRONIC SYSTOLIC (CONGESTIVE) HEART FAILURE: ICD-10-CM

## 2023-01-01 DIAGNOSIS — Z95.1 PRESENCE OF AORTOCORONARY BYPASS GRAFT: ICD-10-CM

## 2023-01-01 DIAGNOSIS — I50.20 UNSPECIFIED SYSTOLIC (CONGESTIVE) HEART FAILURE: ICD-10-CM

## 2023-01-01 LAB
25(OH)D3 SERPL-MCNC: 54.1 NG/ML
ALBUMIN SERPL ELPH-MCNC: 4 G/DL
ALBUMIN SERPL ELPH-MCNC: 4.2 G/DL
ALP BLD-CCNC: 159 U/L
ALP BLD-CCNC: 181 U/L
ALT SERPL-CCNC: 39 U/L
ALT SERPL-CCNC: 47 U/L
ANION GAP SERPL CALC-SCNC: 12 MMOL/L
ANION GAP SERPL CALC-SCNC: 15 MMOL/L
APPEARANCE: CLEAR
APTT BLD: 36.1 SEC
AST SERPL-CCNC: 42 U/L
AST SERPL-CCNC: 44 U/L
BACTERIA: NEGATIVE
BASOPHILS # BLD AUTO: 0.04 K/UL
BASOPHILS NFR BLD AUTO: 0.9 %
BILIRUB SERPL-MCNC: 0.7 MG/DL
BILIRUB SERPL-MCNC: 0.8 MG/DL
BILIRUBIN URINE: NEGATIVE
BLOOD URINE: ABNORMAL
BUN SERPL-MCNC: 47 MG/DL
BUN SERPL-MCNC: 53 MG/DL
CALCIUM SERPL-MCNC: 9 MG/DL
CALCIUM SERPL-MCNC: 9.6 MG/DL
CHLORIDE SERPL-SCNC: 100 MMOL/L
CHLORIDE SERPL-SCNC: 99 MMOL/L
CHOLEST SERPL-MCNC: 151 MG/DL
CO2 SERPL-SCNC: 24 MMOL/L
CO2 SERPL-SCNC: 26 MMOL/L
COLOR: YELLOW
CREAT SERPL-MCNC: 1.78 MG/DL
CREAT SERPL-MCNC: 1.96 MG/DL
CREAT SPEC-SCNC: 47 MG/DL
EGFR: 33 ML/MIN/1.73M2
EGFR: 37 ML/MIN/1.73M2
EOSINOPHIL # BLD AUTO: 0.09 K/UL
EOSINOPHIL NFR BLD AUTO: 1.8 %
ESTIMATED AVERAGE GLUCOSE: 146 MG/DL
FERRITIN SERPL-MCNC: 130 NG/ML
GLUCOSE QUALITATIVE U: ABNORMAL
GLUCOSE SERPL-MCNC: 117 MG/DL
GLUCOSE SERPL-MCNC: 155 MG/DL
HBA1C MFR BLD HPLC: 6.7 %
HCT VFR BLD CALC: 31 %
HCT VFR BLD CALC: 35.8 %
HDLC SERPL-MCNC: 55 MG/DL
HGB BLD-MCNC: 10.1 G/DL
HGB BLD-MCNC: 11.1 G/DL
HYALINE CASTS: 1 /LPF
INR PPP: 1.43 RATIO
IRON SERPL-MCNC: 33 UG/DL
KETONES URINE: NEGATIVE
LDLC SERPL CALC-MCNC: 80 MG/DL
LEUKOCYTE ESTERASE URINE: ABNORMAL
LYMPHOCYTES # BLD AUTO: 0.56 K/UL
LYMPHOCYTES NFR BLD AUTO: 11.5 %
MAN DIFF?: NORMAL
MCHC RBC-ENTMCNC: 31 GM/DL
MCHC RBC-ENTMCNC: 31.4 PG
MCHC RBC-ENTMCNC: 32.6 GM/DL
MCHC RBC-ENTMCNC: 32.7 PG
MCV RBC AUTO: 100.3 FL
MCV RBC AUTO: 101.1 FL
MICROALBUMIN 24H UR DL<=1MG/L-MCNC: 10.6 MG/DL
MICROALBUMIN/CREAT 24H UR-RTO: 226 MG/G
MICROSCOPIC-UA: NORMAL
MONOCYTES # BLD AUTO: 0.3 K/UL
MONOCYTES NFR BLD AUTO: 6.2 %
NEUTROPHILS # BLD AUTO: 3.85 K/UL
NEUTROPHILS NFR BLD AUTO: 79.6 %
NITRITE URINE: NEGATIVE
NONHDLC SERPL-MCNC: 96 MG/DL
PH URINE: 6
PLATELET # BLD AUTO: 131 K/UL
PLATELET # BLD AUTO: 131 K/UL
POTASSIUM SERPL-SCNC: 4.6 MMOL/L
POTASSIUM SERPL-SCNC: 4.7 MMOL/L
PROT SERPL-MCNC: 7.1 G/DL
PROT SERPL-MCNC: 7.4 G/DL
PROTEIN URINE: NORMAL
PSA SERPL-MCNC: 2.1 NG/ML
PT BLD: 16.7 SEC
RBC # BLD: 3.09 M/UL
RBC # BLD: 3.54 M/UL
RBC # FLD: 19 %
RBC # FLD: 19 %
RED BLOOD CELLS URINE: 81 /HPF
SODIUM SERPL-SCNC: 138 MMOL/L
SODIUM SERPL-SCNC: 138 MMOL/L
SPECIFIC GRAVITY URINE: 1.01
SQUAMOUS EPITHELIAL CELLS: 5 /HPF
TRIGL SERPL-MCNC: 81 MG/DL
TSH SERPL-ACNC: 29.4 UIU/ML
UROBILINOGEN URINE: NORMAL
WBC # FLD AUTO: 4.84 K/UL
WBC # FLD AUTO: 5.21 K/UL
WHITE BLOOD CELLS URINE: 14 /HPF

## 2023-01-01 PROCEDURE — 99215 OFFICE O/P EST HI 40 MIN: CPT | Mod: 25

## 2023-01-01 PROCEDURE — 36415 COLL VENOUS BLD VENIPUNCTURE: CPT

## 2023-01-01 PROCEDURE — G0439: CPT

## 2023-01-01 RX ORDER — DAPAGLIFLOZIN 10 MG/1
10 TABLET, FILM COATED ORAL DAILY
Qty: 90 | Refills: 1 | Status: ACTIVE | COMMUNITY
Start: 2023-01-01 | End: 1900-01-01

## 2023-01-01 RX ORDER — LEVOTHYROXINE SODIUM 0.07 MG/1
75 TABLET ORAL DAILY
Qty: 90 | Refills: 1 | Status: ACTIVE | COMMUNITY
Start: 2022-01-01

## 2023-01-01 RX ORDER — LINAGLIPTIN 5 MG/1
5 TABLET, FILM COATED ORAL
Qty: 30 | Refills: 6 | Status: DISCONTINUED | COMMUNITY
Start: 2019-11-15 | End: 2023-01-01

## 2023-01-01 RX ORDER — SACUBITRIL AND VALSARTAN 24; 26 MG/1; MG/1
24-26 TABLET, FILM COATED ORAL DAILY
Refills: 0 | Status: DISCONTINUED | COMMUNITY
Start: 2022-01-01 | End: 2023-01-01

## 2023-01-23 PROBLEM — I50.22 CHRONIC SYSTOLIC CHF (CONGESTIVE HEART FAILURE): Status: ACTIVE | Noted: 2022-01-01

## 2023-01-23 PROBLEM — E03.9 HYPOTHYROID: Status: ACTIVE | Noted: 2019-06-07

## 2023-03-17 PROBLEM — D64.9 ANEMIA, UNSPECIFIED TYPE: Status: ACTIVE | Noted: 2019-05-01

## 2023-03-17 PROBLEM — I25.10 CAD (CORONARY ARTERY DISEASE): Status: ACTIVE | Noted: 2018-12-27

## 2023-03-20 PROBLEM — E11.9 DIABETES: Status: ACTIVE | Noted: 2018-12-27

## 2023-03-20 PROBLEM — I50.20 HFREF (HEART FAILURE WITH REDUCED EJECTION FRACTION): Status: ACTIVE | Noted: 2019-08-01

## 2023-03-20 PROBLEM — Z79.01 WARFARIN ANTICOAGULATION: Status: ACTIVE | Noted: 2022-01-01

## 2023-03-20 PROBLEM — Z95.1 S/P CABG (CORONARY ARTERY BYPASS GRAFT): Status: ACTIVE | Noted: 2019-08-01

## 2023-03-20 PROBLEM — E55.9 VITAMIN D DEFICIENCY: Status: ACTIVE | Noted: 2021-01-28

## 2023-03-20 PROBLEM — I48.21 PERMANENT ATRIAL FIBRILLATION: Status: ACTIVE | Noted: 2019-08-01

## 2023-03-22 PROBLEM — Z01.818 PREOPERATIVE CLEARANCE: Status: ACTIVE | Noted: 2023-01-01

## 2023-03-22 NOTE — RESULTS/DATA
[] : results reviewed [de-identified] : repeat 10/   chronic anemia; stable [de-identified] : inr 1.4, patient has stopped coumadin [de-identified] : bun 53, Creat 1.96, stable ckd [de-identified] : see cardio clearance

## 2023-03-22 NOTE — HISTORY OF PRESENT ILLNESS
[No Pertinent Pulmonary History] : no history of asthma, COPD, sleep apnea, or smoking [No Adverse Anesthesia Reaction] : no adverse anesthesia reaction in self or family member [(Patient denies any chest pain, claudication, dyspnea on exertion, orthopnea, palpitations or syncope)] : Patient denies any chest pain, claudication, dyspnea on exertion, orthopnea, palpitations or syncope [FreeTextEntry1] : TURBT [FreeTextEntry2] : 3/23/ [FreeTextEntry3] : Dr. Perez

## 2023-03-22 NOTE — ASSESSMENT
[Patient Optimized for Surgery] : Patient optimized for surgery [No Further Testing Recommended] : no further testing recommended [FreeTextEntry4] : Patient with stable medical issues:\par 1. see Cardiac clearance\par 2. see renal clearance

## 2023-03-22 NOTE — REVIEW OF SYSTEMS
[Fatigue] : fatigue [Lower Ext Edema] : lower extremity edema [Dyspnea on Exertion] : dyspnea on exertion [Easy Bruising] : easy bruising [Negative] : Psychiatric [Fever] : no fever [Chills] : no chills [Hot Flashes] : no hot flashes [Night Sweats] : no night sweats [Recent Change In Weight] : ~T no recent weight change [Chest Pain] : no chest pain [Palpitations] : no palpitations [Orthopena] : no orthopnea [Paroxysmal Nocturnal Dyspnea] : no paroxysmal nocturnal dyspnea [Shortness Of Breath] : no shortness of breath [Wheezing] : no wheezing [Cough] : no cough [Easy Bleeding] : no easy bleeding [Swollen Glands] : no swollen glands [FreeTextEntry5] : chronic  [FreeTextEntry6] : chronic, unchanged [de-identified] : Patient on coumadin

## 2023-03-22 NOTE — PHYSICAL EXAM
[Normal Rate] : normal rate  [Regular Rhythm] : with a regular rhythm [No CVA Tenderness] : no CVA  tenderness [No Spinal Tenderness] : no spinal tenderness [Kyphosis] : kyphosis [Normal] : affect was normal and insight and judgment were intact [Scoliosis] : no scoliosis [de-identified] : 26 RAMON

## 2023-04-06 ENCOUNTER — NON-APPOINTMENT (OUTPATIENT)
Age: 83
End: 2023-04-06

## 2024-10-21 NOTE — CURRENT MEDS
Rounding Completed. Pt's family at bedside.    Dinner tray served. Peritoneal fluid specimen obtained and walked down to lab by ED PCT.    Plan of Care reviewed. Waiting for disposition.  Elimination needs assessed.    Bed is locked and in lowest position. Call light within reach.   [Takes medication as prescribed] : takes [None] : Patient does not have any barriers to medication adherence